# Patient Record
Sex: MALE | Race: WHITE | NOT HISPANIC OR LATINO | Employment: UNEMPLOYED | URBAN - METROPOLITAN AREA
[De-identification: names, ages, dates, MRNs, and addresses within clinical notes are randomized per-mention and may not be internally consistent; named-entity substitution may affect disease eponyms.]

---

## 2019-01-13 ENCOUNTER — HOSPITAL ENCOUNTER (INPATIENT)
Facility: MEDICAL CENTER | Age: 49
LOS: 2 days | DRG: 378 | End: 2019-01-16
Attending: EMERGENCY MEDICINE | Admitting: INTERNAL MEDICINE
Payer: COMMERCIAL

## 2019-01-13 PROCEDURE — 36415 COLL VENOUS BLD VENIPUNCTURE: CPT

## 2019-01-13 PROCEDURE — 80053 COMPREHEN METABOLIC PANEL: CPT

## 2019-01-13 PROCEDURE — 94760 N-INVAS EAR/PLS OXIMETRY 1: CPT

## 2019-01-13 PROCEDURE — 99285 EMERGENCY DEPT VISIT HI MDM: CPT

## 2019-01-13 PROCEDURE — 85610 PROTHROMBIN TIME: CPT

## 2019-01-13 PROCEDURE — 83690 ASSAY OF LIPASE: CPT

## 2019-01-13 PROCEDURE — 85730 THROMBOPLASTIN TIME PARTIAL: CPT

## 2019-01-13 PROCEDURE — 85025 COMPLETE CBC W/AUTO DIFF WBC: CPT

## 2019-01-14 PROBLEM — E66.9 OBESITY: Status: ACTIVE | Noted: 2019-01-14

## 2019-01-14 PROBLEM — K92.2 GI BLEED: Status: ACTIVE | Noted: 2019-01-14

## 2019-01-14 PROBLEM — K20.90 ESOPHAGITIS: Status: ACTIVE | Noted: 2019-01-14

## 2019-01-14 PROBLEM — Z72.0 TOBACCO ABUSE: Status: ACTIVE | Noted: 2019-01-14

## 2019-01-14 PROBLEM — D64.9 ANEMIA: Status: ACTIVE | Noted: 2019-01-14

## 2019-01-14 PROBLEM — K22.719 BARRETT'S ESOPHAGUS WITH DYSPLASIA: Status: ACTIVE | Noted: 2019-01-14

## 2019-01-14 PROBLEM — D72.829 LEUKOCYTOSIS: Status: ACTIVE | Noted: 2019-01-14

## 2019-01-14 LAB
ABO GROUP BLD: NORMAL
ABO GROUP BLD: NORMAL
ALBUMIN SERPL BCP-MCNC: 3.5 G/DL (ref 3.2–4.9)
ALBUMIN/GLOB SERPL: 1.5 G/DL
ALP SERPL-CCNC: 39 U/L (ref 30–99)
ALT SERPL-CCNC: 18 U/L (ref 2–50)
ANION GAP SERPL CALC-SCNC: 8 MMOL/L (ref 0–11.9)
APTT PPP: 27.5 SEC (ref 24.7–36)
AST SERPL-CCNC: 8 U/L (ref 12–45)
BASOPHILS # BLD AUTO: 0.5 % (ref 0–1.8)
BASOPHILS # BLD: 0.06 K/UL (ref 0–0.12)
BILIRUB SERPL-MCNC: 0.6 MG/DL (ref 0.1–1.5)
BLD GP AB SCN SERPL QL: NORMAL
BUN SERPL-MCNC: 63 MG/DL (ref 8–22)
CALCIUM SERPL-MCNC: 8.4 MG/DL (ref 8.5–10.5)
CHLORIDE SERPL-SCNC: 107 MMOL/L (ref 96–112)
CO2 SERPL-SCNC: 24 MMOL/L (ref 20–33)
CREAT SERPL-MCNC: 1.2 MG/DL (ref 0.5–1.4)
EKG IMPRESSION: NORMAL
EOSINOPHIL # BLD AUTO: 0.22 K/UL (ref 0–0.51)
EOSINOPHIL NFR BLD: 1.8 % (ref 0–6.9)
ERYTHROCYTE [DISTWIDTH] IN BLOOD BY AUTOMATED COUNT: 46.2 FL (ref 35.9–50)
GLOBULIN SER CALC-MCNC: 2.3 G/DL (ref 1.9–3.5)
GLUCOSE SERPL-MCNC: 94 MG/DL (ref 65–99)
HCT VFR BLD AUTO: 30 % (ref 42–52)
HGB BLD-MCNC: 10.1 G/DL (ref 14–18)
HGB BLD-MCNC: 10.3 G/DL (ref 14–18)
HGB BLD-MCNC: 9.4 G/DL (ref 14–18)
IMM GRANULOCYTES # BLD AUTO: 0.13 K/UL (ref 0–0.11)
IMM GRANULOCYTES NFR BLD AUTO: 1.1 % (ref 0–0.9)
INR PPP: 1.12 (ref 0.87–1.13)
LIPASE SERPL-CCNC: 23 U/L (ref 11–82)
LYMPHOCYTES # BLD AUTO: 2.89 K/UL (ref 1–4.8)
LYMPHOCYTES NFR BLD: 24 % (ref 22–41)
MAGNESIUM SERPL-MCNC: 2.3 MG/DL (ref 1.5–2.5)
MCH RBC QN AUTO: 31.3 PG (ref 27–33)
MCHC RBC AUTO-ENTMCNC: 34.3 G/DL (ref 33.7–35.3)
MCV RBC AUTO: 91.2 FL (ref 81.4–97.8)
MONOCYTES # BLD AUTO: 0.97 K/UL (ref 0–0.85)
MONOCYTES NFR BLD AUTO: 8 % (ref 0–13.4)
NEUTROPHILS # BLD AUTO: 7.79 K/UL (ref 1.82–7.42)
NEUTROPHILS NFR BLD: 64.6 % (ref 44–72)
NRBC # BLD AUTO: 0 K/UL
NRBC BLD-RTO: 0 /100 WBC
PATHOLOGY CONSULT NOTE: NORMAL
PLATELET # BLD AUTO: 239 K/UL (ref 164–446)
PMV BLD AUTO: 10.2 FL (ref 9–12.9)
POTASSIUM SERPL-SCNC: 3.5 MMOL/L (ref 3.6–5.5)
PROT SERPL-MCNC: 5.8 G/DL (ref 6–8.2)
PROTHROMBIN TIME: 14.5 SEC (ref 12–14.6)
RBC # BLD AUTO: 3.29 M/UL (ref 4.7–6.1)
RH BLD: NORMAL
RH BLD: NORMAL
SODIUM SERPL-SCNC: 139 MMOL/L (ref 135–145)
WBC # BLD AUTO: 12.1 K/UL (ref 4.8–10.8)

## 2019-01-14 PROCEDURE — 700111 HCHG RX REV CODE 636 W/ 250 OVERRIDE (IP): Performed by: INTERNAL MEDICINE

## 2019-01-14 PROCEDURE — 96365 THER/PROPH/DIAG IV INF INIT: CPT

## 2019-01-14 PROCEDURE — 770020 HCHG ROOM/CARE - TELE (206)

## 2019-01-14 PROCEDURE — C9113 INJ PANTOPRAZOLE SODIUM, VIA: HCPCS | Performed by: INTERNAL MEDICINE

## 2019-01-14 PROCEDURE — 99358 PROLONG SERVICE W/O CONTACT: CPT | Performed by: INTERNAL MEDICINE

## 2019-01-14 PROCEDURE — 93005 ELECTROCARDIOGRAM TRACING: CPT | Performed by: INTERNAL MEDICINE

## 2019-01-14 PROCEDURE — 99223 1ST HOSP IP/OBS HIGH 75: CPT | Mod: 25 | Performed by: INTERNAL MEDICINE

## 2019-01-14 PROCEDURE — 700105 HCHG RX REV CODE 258: Performed by: INTERNAL MEDICINE

## 2019-01-14 PROCEDURE — 700105 HCHG RX REV CODE 258: Performed by: EMERGENCY MEDICINE

## 2019-01-14 PROCEDURE — 36415 COLL VENOUS BLD VENIPUNCTURE: CPT

## 2019-01-14 PROCEDURE — 700111 HCHG RX REV CODE 636 W/ 250 OVERRIDE (IP): Performed by: EMERGENCY MEDICINE

## 2019-01-14 PROCEDURE — 700102 HCHG RX REV CODE 250 W/ 637 OVERRIDE(OP): Performed by: INTERNAL MEDICINE

## 2019-01-14 PROCEDURE — 160002 HCHG RECOVERY MINUTES (STAT): Performed by: INTERNAL MEDICINE

## 2019-01-14 PROCEDURE — 160029 HCHG SURGERY MINUTES - 1ST 30 MINS LEVEL 4: Performed by: INTERNAL MEDICINE

## 2019-01-14 PROCEDURE — 85025 COMPLETE CBC W/AUTO DIFF WBC: CPT

## 2019-01-14 PROCEDURE — 160035 HCHG PACU - 1ST 60 MINS PHASE I: Performed by: INTERNAL MEDICINE

## 2019-01-14 PROCEDURE — 86850 RBC ANTIBODY SCREEN: CPT

## 2019-01-14 PROCEDURE — 700111 HCHG RX REV CODE 636 W/ 250 OVERRIDE (IP)

## 2019-01-14 PROCEDURE — 86901 BLOOD TYPING SEROLOGIC RH(D): CPT

## 2019-01-14 PROCEDURE — 500066 HCHG BITE BLOCK, ECT: Performed by: INTERNAL MEDICINE

## 2019-01-14 PROCEDURE — 0DJ08ZZ INSPECTION OF UPPER INTESTINAL TRACT, VIA NATURAL OR ARTIFICIAL OPENING ENDOSCOPIC: ICD-10-PCS | Performed by: INTERNAL MEDICINE

## 2019-01-14 PROCEDURE — 83735 ASSAY OF MAGNESIUM: CPT

## 2019-01-14 PROCEDURE — 93010 ELECTROCARDIOGRAM REPORT: CPT | Performed by: INTERNAL MEDICINE

## 2019-01-14 PROCEDURE — 86900 BLOOD TYPING SEROLOGIC ABO: CPT

## 2019-01-14 PROCEDURE — 700101 HCHG RX REV CODE 250

## 2019-01-14 PROCEDURE — C9113 INJ PANTOPRAZOLE SODIUM, VIA: HCPCS | Performed by: EMERGENCY MEDICINE

## 2019-01-14 PROCEDURE — 88305 TISSUE EXAM BY PATHOLOGIST: CPT

## 2019-01-14 PROCEDURE — 99407 BEHAV CHNG SMOKING > 10 MIN: CPT | Performed by: INTERNAL MEDICINE

## 2019-01-14 PROCEDURE — 84443 ASSAY THYROID STIM HORMONE: CPT

## 2019-01-14 PROCEDURE — 160009 HCHG ANES TIME/MIN: Performed by: INTERNAL MEDICINE

## 2019-01-14 PROCEDURE — 160048 HCHG OR STATISTICAL LEVEL 1-5: Performed by: INTERNAL MEDICINE

## 2019-01-14 PROCEDURE — 80048 BASIC METABOLIC PNL TOTAL CA: CPT

## 2019-01-14 PROCEDURE — A9270 NON-COVERED ITEM OR SERVICE: HCPCS | Performed by: INTERNAL MEDICINE

## 2019-01-14 PROCEDURE — 85018 HEMOGLOBIN: CPT

## 2019-01-14 PROCEDURE — 83036 HEMOGLOBIN GLYCOSYLATED A1C: CPT

## 2019-01-14 RX ORDER — ONDANSETRON 2 MG/ML
4 INJECTION INTRAMUSCULAR; INTRAVENOUS
Status: DISCONTINUED | OUTPATIENT
Start: 2019-01-14 | End: 2019-01-14 | Stop reason: HOSPADM

## 2019-01-14 RX ORDER — ALBUTEROL SULFATE 90 UG/1
2 AEROSOL, METERED RESPIRATORY (INHALATION)
Status: DISCONTINUED | OUTPATIENT
Start: 2019-01-14 | End: 2019-01-16 | Stop reason: HOSPADM

## 2019-01-14 RX ORDER — NICOTINE 21 MG/24HR
14 PATCH, TRANSDERMAL 24 HOURS TRANSDERMAL
Status: DISCONTINUED | OUTPATIENT
Start: 2019-01-14 | End: 2019-01-16 | Stop reason: HOSPADM

## 2019-01-14 RX ORDER — OXYCODONE HCL 5 MG/5 ML
5 SOLUTION, ORAL ORAL
Status: DISCONTINUED | OUTPATIENT
Start: 2019-01-14 | End: 2019-01-14 | Stop reason: HOSPADM

## 2019-01-14 RX ORDER — ONDANSETRON 4 MG/1
4 TABLET, ORALLY DISINTEGRATING ORAL EVERY 4 HOURS PRN
Status: DISCONTINUED | OUTPATIENT
Start: 2019-01-14 | End: 2019-01-16 | Stop reason: HOSPADM

## 2019-01-14 RX ORDER — SODIUM CHLORIDE, SODIUM LACTATE, POTASSIUM CHLORIDE, CALCIUM CHLORIDE 600; 310; 30; 20 MG/100ML; MG/100ML; MG/100ML; MG/100ML
INJECTION, SOLUTION INTRAVENOUS CONTINUOUS
Status: DISCONTINUED | OUTPATIENT
Start: 2019-01-14 | End: 2019-01-14 | Stop reason: HOSPADM

## 2019-01-14 RX ORDER — ONDANSETRON 2 MG/ML
4 INJECTION INTRAMUSCULAR; INTRAVENOUS EVERY 4 HOURS PRN
Status: DISCONTINUED | OUTPATIENT
Start: 2019-01-14 | End: 2019-01-16 | Stop reason: HOSPADM

## 2019-01-14 RX ORDER — PANTOPRAZOLE SODIUM 40 MG/10ML
40 INJECTION, POWDER, LYOPHILIZED, FOR SOLUTION INTRAVENOUS DAILY
Status: DISCONTINUED | OUTPATIENT
Start: 2019-01-14 | End: 2019-01-15

## 2019-01-14 RX ORDER — IPRATROPIUM BROMIDE AND ALBUTEROL SULFATE 2.5; .5 MG/3ML; MG/3ML
3 SOLUTION RESPIRATORY (INHALATION)
Status: DISCONTINUED | OUTPATIENT
Start: 2019-01-14 | End: 2019-01-14 | Stop reason: HOSPADM

## 2019-01-14 RX ORDER — OXYCODONE HCL 5 MG/5 ML
10 SOLUTION, ORAL ORAL
Status: DISCONTINUED | OUTPATIENT
Start: 2019-01-14 | End: 2019-01-14 | Stop reason: HOSPADM

## 2019-01-14 RX ORDER — ACETAMINOPHEN 325 MG/1
650 TABLET ORAL EVERY 6 HOURS PRN
Status: DISCONTINUED | OUTPATIENT
Start: 2019-01-14 | End: 2019-01-16 | Stop reason: HOSPADM

## 2019-01-14 RX ORDER — HALOPERIDOL 5 MG/ML
1 INJECTION INTRAMUSCULAR
Status: DISCONTINUED | OUTPATIENT
Start: 2019-01-14 | End: 2019-01-14 | Stop reason: HOSPADM

## 2019-01-14 RX ORDER — ACETAMINOPHEN 500 MG
1000 TABLET ORAL EVERY 8 HOURS PRN
Status: DISCONTINUED | OUTPATIENT
Start: 2019-01-14 | End: 2019-01-14 | Stop reason: HOSPADM

## 2019-01-14 RX ORDER — MEPERIDINE HYDROCHLORIDE 25 MG/ML
12.5 INJECTION INTRAMUSCULAR; INTRAVENOUS; SUBCUTANEOUS
Status: DISCONTINUED | OUTPATIENT
Start: 2019-01-14 | End: 2019-01-14 | Stop reason: HOSPADM

## 2019-01-14 RX ORDER — SODIUM CHLORIDE, SODIUM LACTATE, POTASSIUM CHLORIDE, CALCIUM CHLORIDE 600; 310; 30; 20 MG/100ML; MG/100ML; MG/100ML; MG/100ML
INJECTION, SOLUTION INTRAVENOUS CONTINUOUS
Status: DISCONTINUED | OUTPATIENT
Start: 2019-01-14 | End: 2019-01-15

## 2019-01-14 RX ADMIN — SODIUM CHLORIDE 8 MG/HR: 9 INJECTION, SOLUTION INTRAVENOUS at 01:42

## 2019-01-14 RX ADMIN — SODIUM CHLORIDE, POTASSIUM CHLORIDE, SODIUM LACTATE AND CALCIUM CHLORIDE: 600; 310; 30; 20 INJECTION, SOLUTION INTRAVENOUS at 17:53

## 2019-01-14 RX ADMIN — SODIUM CHLORIDE, POTASSIUM CHLORIDE, SODIUM LACTATE AND CALCIUM CHLORIDE: 600; 310; 30; 20 INJECTION, SOLUTION INTRAVENOUS at 09:44

## 2019-01-14 RX ADMIN — SODIUM CHLORIDE, POTASSIUM CHLORIDE, SODIUM LACTATE AND CALCIUM CHLORIDE: 600; 310; 30; 20 INJECTION, SOLUTION INTRAVENOUS at 01:41

## 2019-01-14 RX ADMIN — NICOTINE 14 MG: 14 PATCH, EXTENDED RELEASE TRANSDERMAL at 05:03

## 2019-01-14 RX ADMIN — PANTOPRAZOLE SODIUM 40 MG: 40 INJECTION, POWDER, LYOPHILIZED, FOR SOLUTION INTRAVENOUS at 17:50

## 2019-01-14 RX ADMIN — SODIUM CHLORIDE 8 MG/HR: 9 INJECTION, SOLUTION INTRAVENOUS at 00:34

## 2019-01-14 RX ADMIN — SODIUM CHLORIDE 8 MG/HR: 9 INJECTION, SOLUTION INTRAVENOUS at 12:00

## 2019-01-14 ASSESSMENT — ENCOUNTER SYMPTOMS
ORTHOPNEA: 0
SHORTNESS OF BREATH: 1
BRUISES/BLEEDS EASILY: 0
COUGH: 0
SEIZURES: 0
ABDOMINAL PAIN: 1
NAUSEA: 1
ROS GI COMMENTS: HEMATEMESIS
HEARTBURN: 0
BLURRED VISION: 0
HEADACHES: 0
WHEEZING: 0
FEVER: 0
CHILLS: 0
SPUTUM PRODUCTION: 0
CHILLS: 1
BACK PAIN: 0
PND: 0
HEMOPTYSIS: 0
HEARTBURN: 1
DIAPHORESIS: 0
PHOTOPHOBIA: 0
ABDOMINAL PAIN: 0
FLANK PAIN: 0
FOCAL WEAKNESS: 0
COUGH: 1
DIZZINESS: 1
SORE THROAT: 0
VOMITING: 1
DIARRHEA: 1
WEIGHT LOSS: 0
PALPITATIONS: 0
NECK PAIN: 0
MYALGIAS: 0
BLOOD IN STOOL: 1
BLOOD IN STOOL: 0
DIARRHEA: 0
CONSTIPATION: 0

## 2019-01-14 ASSESSMENT — COPD QUESTIONNAIRES
HAVE YOU SMOKED AT LEAST 100 CIGARETTES IN YOUR ENTIRE LIFE: YES
IN THE PAST 12 MONTHS DO YOU DO LESS THAN YOU USED TO BECAUSE OF YOUR BREATHING PROBLEMS: DISAGREE/UNSURE
DURING THE PAST 4 WEEKS HOW MUCH DID YOU FEEL SHORT OF BREATH: SOME OF THE TIME
COPD SCREENING SCORE: 3
HAVE YOU SMOKED AT LEAST 100 CIGARETTES IN YOUR ENTIRE LIFE: YES
COPD SCREENING SCORE: 6
DO YOU EVER COUGH UP ANY MUCUS OR PHLEGM?: YES, EVERY DAY
DO YOU EVER COUGH UP ANY MUCUS OR PHLEGM?: NO/ONLY WITH OCCASIONAL COLDS OR INFECTIONS
DURING THE PAST 4 WEEKS HOW MUCH DID YOU FEEL SHORT OF BREATH: SOME OF THE TIME

## 2019-01-14 ASSESSMENT — COGNITIVE AND FUNCTIONAL STATUS - GENERAL
SUGGESTED CMS G CODE MODIFIER MOBILITY: CJ
MOBILITY SCORE: 22
DAILY ACTIVITIY SCORE: 24
MOVING FROM LYING ON BACK TO SITTING ON SIDE OF FLAT BED: A LITTLE
CLIMB 3 TO 5 STEPS WITH RAILING: A LITTLE
SUGGESTED CMS G CODE MODIFIER DAILY ACTIVITY: CH

## 2019-01-14 ASSESSMENT — LIFESTYLE VARIABLES
TOTAL SCORE: 0
TOTAL SCORE: 0
EVER FELT BAD OR GUILTY ABOUT YOUR DRINKING: NO
EVER_SMOKED: YES
HOW MANY TIMES IN THE PAST YEAR HAVE YOU HAD 5 OR MORE DRINKS IN A DAY: 0
HAVE PEOPLE ANNOYED YOU BY CRITICIZING YOUR DRINKING: NO
AVERAGE NUMBER OF DAYS PER WEEK YOU HAVE A DRINK CONTAINING ALCOHOL: 1
ALCOHOL_USE: YES
EVER HAD A DRINK FIRST THING IN THE MORNING TO STEADY YOUR NERVES TO GET RID OF A HANGOVER: NO
CONSUMPTION TOTAL: NEGATIVE
EVER_SMOKED: YES
TOTAL SCORE: 0
EVER_SMOKED: YES
ON A TYPICAL DAY WHEN YOU DRINK ALCOHOL HOW MANY DRINKS DO YOU HAVE: 2
HAVE YOU EVER FELT YOU SHOULD CUT DOWN ON YOUR DRINKING: NO

## 2019-01-14 ASSESSMENT — PAIN SCALES - GENERAL
PAINLEVEL_OUTOF10: 0

## 2019-01-14 ASSESSMENT — PATIENT HEALTH QUESTIONNAIRE - PHQ9
2. FEELING DOWN, DEPRESSED, IRRITABLE, OR HOPELESS: NOT AT ALL
SUM OF ALL RESPONSES TO PHQ9 QUESTIONS 1 AND 2: 0
1. LITTLE INTEREST OR PLEASURE IN DOING THINGS: NOT AT ALL

## 2019-01-14 NOTE — ED TRIAGE NOTES
Jose Carlos Bill  48 y.o. male  Chief Complaint   Patient presents with   • Upper GI Bleed         Pt is alert and oriented, speaking in full sentences, follows commands and responds appropriately to questions. Resp are even and unlabored. No behavioral indicators of pain.    Pt transferred from [facility] for further work up of a GI bleed. Pt received protonix IV push as well as Protonix gtt, pt also received 1 unit PRBC.

## 2019-01-14 NOTE — H&P
Hospital Medicine History & Physical Note    Date of Service  1/14/2019    Primary Care Physician  No primary care provider on file.    Consultants  None    Code Status  Full code    Chief Complaint  Hematemesis    History of Presenting Illness  48 y.o. male with a past medical history of obesity, hypertension, hyperlipidemia and tobacco abuse who presented 1/13/2019 with melena and hematemesis that started yesterday.  The patient reported multiple episodes of loose melanotic stools for the past 2 days.  Yesterday he developed nausea with multiple episodes of hematemesis.  He reported associated shortness of breath and dizziness.  He reports some chills.  He denied any chest pain, abdominal pain, fevers, headache or dysuria.  He reports drinking 1 alcoholic beverage every 3 weeks.  He denies taking any blood thinners or using NSAIDs.  He smokes 1-1/2 packs of cigarettes daily.  He denies any previous history of GI bleeding.    He presented to [facility], I have reviewed the medical records which are summarized as follows. EKG interpreted by me reveals sinus tachycardia with incomplete right bundle branch block.  No ST elevation noted.  WBC 12, hemoglobin 10.8, hematocrit 31.2, MCV 90, platelets 271, absolute neutrophils 7.6, absolute monocytes 1.0, sodium 139, potassium 3.3, chloride 103, CO2 23, glucose 102, BUN 74, creatinine 1.22, AST 13, ALT 27, alk phos 51, total bili 0.6, total protein 6.2, albumin 3.7, calcium 9, lipase 34, INR 1, APTT 23.  Troponin 0 0.028, BNP 10, lactic 1.7.  Patient was given IV Protonix bolus and transferred to [facility] for evaluation by GI.    Review of Systems  Review of Systems   Constitutional: Positive for chills. Negative for diaphoresis and fever.   HENT: Negative for hearing loss and sore throat.    Eyes: Negative for blurred vision.   Respiratory: Positive for shortness of breath. Negative for cough, sputum production and wheezing.    Cardiovascular: Negative for chest pain,  palpitations and leg swelling.   Gastrointestinal: Positive for melena, nausea and vomiting. Negative for abdominal pain, blood in stool and diarrhea.        Hematemesis   Genitourinary: Negative for dysuria, flank pain and urgency.   Musculoskeletal: Negative for back pain, joint pain, myalgias and neck pain.   Skin: Negative for rash.   Neurological: Positive for dizziness. Negative for focal weakness, seizures and headaches.   Endo/Heme/Allergies: Does not bruise/bleed easily.   Psychiatric/Behavioral: Negative for suicidal ideas.   All other systems reviewed and are negative.      Past Medical History  Obesity, hypertension, tobacco abuse    Surgical History  No pertinent surgical history    Family History  No pertinent family history    Social History   reports that he has been smoking.  He has been smoking about 1.00 pack per day. He has never used smokeless tobacco. He reports that he drinks alcohol. He reports that he does not use drugs.    Allergies  No Known Allergies    Medications  None       Physical Exam  Temp:  [37.4 °C (99.3 °F)] 37.4 °C (99.3 °F)  Pulse:  [90-92] 91  Resp:  [13-17] 13  BP: (129)/(70) 129/70    Physical Exam   Constitutional: He is oriented to person, place, and time. He appears well-developed and well-nourished. No distress.   Obese   HENT:   Head: Normocephalic and atraumatic.   Mouth/Throat: Oropharynx is clear and moist.   Eyes: Pupils are equal, round, and reactive to light. Conjunctivae are normal. No scleral icterus.   Neck: Normal range of motion. Neck supple.   Cardiovascular: Regular rhythm and normal heart sounds.    Tachycardic   Pulmonary/Chest: Effort normal and breath sounds normal. No respiratory distress. He has no wheezes. He has no rales.   Abdominal: Soft. Bowel sounds are normal. He exhibits no distension. There is no tenderness. There is no rebound.   Musculoskeletal: Normal range of motion. He exhibits no edema or tenderness.   Lymphadenopathy:     He has no  cervical adenopathy.   Neurological: He is alert and oriented to person, place, and time. No cranial nerve deficit. Coordination normal.   Skin: Skin is warm. No rash noted.   Psychiatric: He has a normal mood and affect. His behavior is normal.   Nursing note and vitals reviewed.      Laboratory:  Recent Labs      01/13/19   2339   WBC  12.1*   RBC  3.29*   HEMOGLOBIN  10.3*   HEMATOCRIT  30.0*   MCV  91.2   MCH  31.3   MCHC  34.3   RDW  46.2   PLATELETCT  239   MPV  10.2         No results for input(s): ALTSGPT, ASTSGOT, ALKPHOSPHAT, TBILIRUBIN, DBILIRUBIN, GAMMAGT, AMYLASE, LIPASE, ALB, PREALBUMIN, GLUCOSE in the last 72 hours.              No results for input(s): TROPONINI in the last 72 hours.    Urinalysis:    No results found     Imaging:  No orders to display         Assessment/Plan:  I anticipate this patient will require at least two midnights for appropriate medical management, necessitating inpatient admission.    GI bleed- (present on admission)   Assessment & Plan    With hematemesis and melena, likely upper GI source  Continuous cardiac monitoring  Patient has been started on IV fluid hydration with lactated ringer  NPO  Patient is started on IV Protonix  Monitor H&H every 8 hours, transfuse for hemoglobin less than 7  Coagulation studies within normal limits  We will consult GI in the morning for endoscopy evaluation       Anemia- (present on admission)   Assessment & Plan    Secondary to GI bleed  Monitor CBC, transfuse for hemoglobin less than 7       Leukocytosis- (present on admission)   Assessment & Plan    No obvious evidence of infection at this time, likely leukemoid reaction  Monitor CBC and vitals     Obesity- (present on admission)   Assessment & Plan    Body mass index is 44.48 kg/m².  Pt educated on the increase of morbidity and mortality associated with excess weight including DM, Heart Disease, HTN, stroke, and sleep apnea.  Pt advised weight loss of 5% through reduced calorie, low  carb diet and 150 mins of exercise a week       Tobacco abuse- (present on admission)   Assessment & Plan    Tobacco cessation education provided for more than 10 minutes, discussed options of nicotine patch, medical treatment with wellbutrin and chantix. Discussed the risks of smoking including increased risk of heart disease, stroke, cancer and COPD. Discussed the benefits of quitting smoking. Nicotine replacement protocol will be provided to the patient.           VTE prophylaxis: SCD    I spent a total of 30 minutes of non face to face time performing additional research, reviewing medical records from transferring facility, discussing plan of care with other healthcare providers. Start time: 12 25 am. End time: 12 55 am

## 2019-01-14 NOTE — PROGRESS NOTES
Patient arrived to [id number] bed 2. Transferred into bed via walking. Received report from ED RN, Pt assessed, A&Ox4, Vitals stable, pt complains of no pain. No SOB Noted.   Pt updated on plan of care, Call light within reach, personal belongings within reach.  Bed in lowest position.Pt ambulates via standby assist. Tele monitor on and monitor room notified. Hourly rounding in place.

## 2019-01-14 NOTE — OP REPORT
DATE OF SERVICE:  01/14/2019    INDICATION FOR PROCEDURE:  GI bleeding.    CONSENT:  Informed consent was obtained directly from the patient after   benefits, risks and possible alternatives were discussed.    MEDICATIONS:  The patient received general anesthetic from Dr. Mccall from   anesthesia.  Please see the notes for full details.    PROCEDURE DESCRIPTION:  The patient was placed in the supine position and   endotracheally intubated.  When ready, the upper endoscope was placed in the   patient's mouth and advanced easily and carefully to the esophagus and   subsequently the stomach and duodenum.    FINDINGS:  In the esophagus, there was severe somewhat cavernous ulcerated   reflux esophagitis present beginning at the GE junction extending proximally   approximately 10 cm.  Additionally, there was reddish mucosa consistent with   Hoffman's mucosa.  The overall appearance suggested a benign process, no   biopsies were obtained as there was significant amount nodularity to the   ulceration.  The stomach had a small hiatal hernia, otherwise was normal and   empty.  The duodenum appeared normal to the second portion.  Retroflexed views   of the cardia and angularis were obtained.  The scope was then withdrawn   after excess air was removed from the gastric lumen.    COMPLICATIONS:  No complications during or in the immediate postoperative   period.    IMPRESSION:  1.  Severe ulcerated reflux esophagitis, x10 cm, with likely associated   Hoffman's mucosa.  2.  Small hiatal hernia.  3.  Otherwise, normal upper endoscopy.    RECOMMENDATION:  The patient will undergo reflux precautions.  We will   continue on PPIs, though we will discontinue his PPI drip at this time and   transition him to IV PPIs once daily, and then hopefully tomorrow to oral   b.i.d.  The patient will require repeat upper endoscopy in about 8-10 weeks   for reevaluation and rebiopsy.       ___________________________________Chad SPRINGER  MD BROWN / AYAKA    DD:  01/14/2019 15:23:05  DT:  01/14/2019 15:35:25    D#:  [id number]  Job#:  [id number]

## 2019-01-14 NOTE — ASSESSMENT & PLAN NOTE
Body mass index is 44.48 kg/m².  Pt educated on the increase of morbidity and mortality associated with excess weight including DM, Heart Disease, HTN, stroke, and sleep apnea.  Pt advised weight loss of 5% through reduced calorie, low carb diet and 150 mins of exercise a week

## 2019-01-14 NOTE — CONSULTS
GASTROENTEROLOGY CONSULTANT NOTE    Chief Complaint:   Coffee ground emesis and loose dark stools since 2 days    HPI:    Patient is a 48-year-old male with a past medical history of hypertension, hyperlipidemia, obesity and tobacco abuse not on any home meds who presents as a transfer from [place] for further evaluation of upper GI bleed.    Patient was apparently well until yesterday morning when he woke up not feeling so well.  He was nauseous, vomited x1-brownish blackish food with water and also had 2 episodes of loose brownish blackish stool.  He was short of breath, dizzy and this decided to go to the ED.  At [facility] he was noted to be hypotensive, tachycardic, H&H of 10.8/30, BUN 74, creatinine 1.2.  He was started on a Protonix drip, was given 1 unit PRBCs and transferred to [facility] for further evaluation.    Since admission here, patient denies any more episodes of emesis or loose stools. Patient states that he has intermittent complaints of food getting stuck in throat and trouble swallowing foods like bread and need to drink water to push the food down.   Denies any heartburn symptoms now. Denies any similar history in the past.  Denies NSAID use.  Drinks 1 mixed drink once every 3 weeks.  Denies any family history of colon cancer.  Never got a colonoscopy or endoscopy in the past.    Remains hemodynamically stable.  Hemoglobin stable at 10.1.    Review of Systems   Constitutional: Positive for chills and malaise/fatigue.   Respiratory: Positive for cough and shortness of breath.    Cardiovascular: Positive for leg swelling. Negative for chest pain.   Gastrointestinal: Positive for diarrhea, nausea and vomiting. Negative for abdominal pain and heartburn.             Past Medical History  HTN  HLD  Obesity  Tobacco abuse    Past Surgical History:  History reviewed. No pertinent surgical history.    Current Outpatient Medications:  Home Medications     Reviewed by Jerome Nails (Pharmacy Tech) on  "01/14/19 at 1228  Med List Status: Complete   Medication Last Dose Status        Patient Chema Taking any Medications                       Medication Allergy/Sensitivities:  No Known Allergies      Family History (mandatory)   History reviewed. No pertinent family history.  Denies any family h/o colon cancer    Social History (mandatory)   Social History     Social History   • Marital status: Single     Spouse name: N/A   • Number of children: N/A   • Years of education: N/A     Occupational History   • Not on file.     Social History Main Topics   • Smoking status: Current Every Day Smoker     Packs/day: 1.00   • Smokeless tobacco: Never Used   • Alcohol use Yes      Comment: occ   • Drug use: No   • Sexual activity: Not on file     Other Topics Concern   • Not on file     Social History Narrative   • No narrative on file   Smokes >1ppd since 5-6 years  Drinks 1 alcoholic drink every 3 weeks  Denies drug use  Denies use of NSAIDs      Physical Exam     Vitals:    01/14/19 0400 01/14/19 0730 01/14/19 1200 01/14/19 1213   BP: 138/76 137/88 112/82    Pulse: 92 80 80 84   Resp: 16 18 17 16   Temp: 36.3 °C (97.4 °F) 36.4 °C (97.6 °F) 36.1 °C (96.9 °F)    TempSrc: Temporal Temporal Temporal    SpO2: 98% 96% 98%    Weight:       Height:         Body mass index is 48.82 kg/m².  /82   Pulse 84   Temp 36.1 °C (96.9 °F) (Temporal)   Resp 16   Ht 1.676 m (5' 6\")   Wt (!) 137.2 kg (302 lb 7.5 oz)   SpO2 98%   BMI 48.82 kg/m²   O2 therapy: Pulse Oximetry: 98 %, O2 (LPM): 0, O2 Delivery: None (Room Air)    Physical Exam   Constitutional: He is oriented to person, place, and time and well-developed, well-nourished, and in no distress.   HENT:   Head: Normocephalic.   Eyes: Pupils are equal, round, and reactive to light. EOM are normal.   Neck: Normal range of motion.   Cardiovascular: Normal rate and regular rhythm.    Pulmonary/Chest: Effort normal.   Abdominal: Soft. There is no tenderness.   Musculoskeletal: He " exhibits edema.   Neurological: He is alert and oriented to person, place, and time.   Skin: Skin is warm.         Data Review           Lab Data Review:  Recent Results (from the past 24 hour(s))   CBC WITH DIFFERENTIAL    Collection Time: 01/13/19 11:39 PM   Result Value Ref Range    WBC 12.1 (H) 4.8 - 10.8 K/uL    RBC 3.29 (L) 4.70 - 6.10 M/uL    Hemoglobin 10.3 (L) 14.0 - 18.0 g/dL    Hematocrit 30.0 (L) 42.0 - 52.0 %    MCV 91.2 81.4 - 97.8 fL    MCH 31.3 27.0 - 33.0 pg    MCHC 34.3 33.7 - 35.3 g/dL    RDW 46.2 35.9 - 50.0 fL    Platelet Count 239 164 - 446 K/uL    MPV 10.2 9.0 - 12.9 fL    Neutrophils-Polys 64.60 44.00 - 72.00 %    Lymphocytes 24.00 22.00 - 41.00 %    Monocytes 8.00 0.00 - 13.40 %    Eosinophils 1.80 0.00 - 6.90 %    Basophils 0.50 0.00 - 1.80 %    Immature Granulocytes 1.10 (H) 0.00 - 0.90 %    Nucleated RBC 0.00 /100 WBC    Neutrophils (Absolute) 7.79 (H) 1.82 - 7.42 K/uL    Lymphs (Absolute) 2.89 1.00 - 4.80 K/uL    Monos (Absolute) 0.97 (H) 0.00 - 0.85 K/uL    Eos (Absolute) 0.22 0.00 - 0.51 K/uL    Baso (Absolute) 0.06 0.00 - 0.12 K/uL    Immature Granulocytes (abs) 0.13 (H) 0.00 - 0.11 K/uL    NRBC (Absolute) 0.00 K/uL   COMP METABOLIC PANEL    Collection Time: 01/13/19 11:39 PM   Result Value Ref Range    Sodium 139 135 - 145 mmol/L    Potassium 3.5 (L) 3.6 - 5.5 mmol/L    Chloride 107 96 - 112 mmol/L    Co2 24 20 - 33 mmol/L    Anion Gap 8.0 0.0 - 11.9    Glucose 94 65 - 99 mg/dL    Bun 63 (H) 8 - 22 mg/dL    Creatinine 1.20 0.50 - 1.40 mg/dL    Calcium 8.4 (L) 8.5 - 10.5 mg/dL    AST(SGOT) 8 (L) 12 - 45 U/L    ALT(SGPT) 18 2 - 50 U/L    Alkaline Phosphatase 39 30 - 99 U/L    Total Bilirubin 0.6 0.1 - 1.5 mg/dL    Albumin 3.5 3.2 - 4.9 g/dL    Total Protein 5.8 (L) 6.0 - 8.2 g/dL    Globulin 2.3 1.9 - 3.5 g/dL    A-G Ratio 1.5 g/dL   LIPASE    Collection Time: 01/13/19 11:39 PM   Result Value Ref Range    Lipase 23 11 - 82 U/L   PROTHROMBIN TIME (INR)    Collection Time: 01/13/19 11:39  PM   Result Value Ref Range    PT 14.5 12.0 - 14.6 sec    INR 1.12 0.87 - 1.13   APTT    Collection Time: 01/13/19 11:39 PM   Result Value Ref Range    APTT 27.5 24.7 - 36.0 sec   COD (ADULT)    Collection Time: 01/13/19 11:39 PM   Result Value Ref Range    ABO Grouping Only A     Rh Grouping Only POS     Antibody Screen-Cod NEG    ESTIMATED GFR    Collection Time: 01/13/19 11:39 PM   Result Value Ref Range    GFR If African American >60 >60 mL/min/1.73 m 2    GFR If Non African American >60 >60 mL/min/1.73 m 2   ABO AND RH CONFIRMATION    Collection Time: 01/14/19 12:38 AM   Result Value Ref Range    ABO Confirm A     Second Rh Group POS    Magnesium    Collection Time: 01/14/19  2:35 AM   Result Value Ref Range    Magnesium 2.3 1.5 - 2.5 mg/dL   HGB (Hemoglobin) for 48 hours    Collection Time: 01/14/19  7:04 AM   Result Value Ref Range    Hemoglobin 10.1 (L) 14.0 - 18.0 g/dL          ASSESSMENT AND PLAN:    # Upper GI bleed  # Dysphagia  # HTN  # Tobacco abuse  # Obesity    - Since admission here, patient denies any more episodes of emesis or loose stools  - Remains hemodynamically stable.   - Hemoglobin stable at 10.1.  - Cause of upper GI bleed likely 2/2 reflux vs gastritis  - Continue protonix drip  - NPO  - Plan for upper GI endoscopy today  - Further recs based on endoscopy findings    Quality Measures  Quality-Core Measures  PCP: No primary care provider on file.

## 2019-01-14 NOTE — ASSESSMENT & PLAN NOTE
With hematemesis and melena, likely upper GI source  Continuous cardiac monitoring  Monitor H&H every 8 hours, transfuse for hemoglobin less than 7- monitor for bleeding  S/P EGD that found no ulcer and pt had severe ulcerated esophagitis and snider esophagus  On IV protonix

## 2019-01-14 NOTE — CARE PLAN
Problem: Communication  Goal: The ability to communicate needs accurately and effectively will improve  Outcome: PROGRESSING AS EXPECTED  POC reviewed and all questions answered. Pt verbalized understanding of treatment and medications. Calls appropriately with needs and asks questions regarding care.       Problem: Safety  Goal: Will remain free from injury  Outcome: PROGRESSING AS EXPECTED  Pt oriented to call light system and educated to call for assistance. Non slip socks on. Fall precautions in place. Verbalized understanding of safety measures. Bed in locked and low position, call light in reach.

## 2019-01-14 NOTE — PROGRESS NOTES
Report received at the bed side. No family at bedside. Pt A/O x4. No complains of pain or SOB. Call light and belongings within reach. Bed alarm in place. Bed in lowest position.

## 2019-01-14 NOTE — PROGRESS NOTES
2 RN skin check completed with Shaila RN:  Skin intact, no open areas or wounds noted.  Mild redness/swelling to bilateral lower extremities, pt states this is normal for him.  Redness to R toe  No other areas of concern noted.

## 2019-01-14 NOTE — ED PROVIDER NOTES
ED Provider Note    ED Provider Note      Primary care provider: No primary care provider on file.    CHIEF COMPLAINT  Chief Complaint   Patient presents with   • Upper GI Bleed       HPI  Jose Carlos Khan is a 48 y.o. male who presents to the Emergency Department with chief complaint of upper GI bleed.  Patient was transferred from outside hospital after having evidence of upper GI bleed tachycardia hypotension.  He had H&H of approximately 10 and 30 at the outside hospital he was loaded with Protonix and was started on Protonix drip.  At arrival patient reports complete resolution of his symptoms that this morning he woke up felt as though he had a bloody nose but then later determined that he had vomited up blood.  He also reports that he said fairly loose stool over the last couple days that said some very dark appearance.  No previous history of GI bleed he states he has an alcoholic beverages approximately every 3weeks he denies any heavy NSAID use states he did use to use ibuprofen regularly but stopped after his father had problems with upper GI bleeding.  He has had some chills a little bit of cough recently no chest pain or shortness of breath he had a slight headache a few days ago for which he took half of Norco with alleviation.  Currently feeling much better with no other acute symptoms or concerns.    REVIEW OF SYSTEMS  10 systems reviewed and otherwise negative, pertinent positives and negatives listed in the history of present illness.    PAST MEDICAL HISTORY       SURGICAL HISTORY  patient denies any surgical history    SOCIAL HISTORY  Social History   Substance Use Topics   • Smoking status: Current Every Day Smoker     Packs/day: 1.00   • Smokeless tobacco: Never Used   • Alcohol use Yes      Comment: occ      History   Drug Use No       FAMILY HISTORY  Non-Contributory    CURRENT MEDICATIONS  Home Medications     Reviewed by Maximo Oseguera R.N. (Registered Nurse) on 01/13/19 at 5381  Med List  "Status: Partial   Medication Last Dose Status        Patient Chema Taking any Medications                       ALLERGIES  No Known Allergies    PHYSICAL EXAM  VITAL SIGNS: /70   Pulse 92   Temp 37.4 °C (99.3 °F) (Temporal)   Resp 17   Ht 1.676 m (5' 6\")   Wt 125 kg (275 lb 9.2 oz)   SpO2 95%   BMI 44.48 kg/m²   Pulse ox interpretation: I interpret this pulse ox as normal.  Constitutional: Alert and oriented x 3, no acute distress  HEENT: Atraumatic normocephalic, pupils are equal round reactive to light extraocular movements are intact. The nares is clear, external ears are normal, mouth shows moist mucous membranes  Neck: Supple, no JVD no tracheal deviation  Cardiovascular: Regular rate and rhythm no murmur rub or gallop 2+ pulses peripherally x4  Thorax & Lungs: No respiratory distress, no wheezes rales or rhonchi, No chest tenderness.   GI: Soft nontender nondistended positive bowel sounds, no peritoneal signs  Skin: Warm dry no acute rash or lesion  Musculoskeletal: Moving all extremities with full range and 5 of 5 strength, no acute  deformity 2+ pitting edema bilateral lower extremities to the midshin  Neurologic: Cranial nerves III through XII are grossly intact, no sensory deficit, no cerebellar dysfunction   Psychiatric: Appropriate affect for situation at this time      DIAGNOSTIC STUDIES / PROCEDURES  LABS      Results for orders placed or performed during the hospital encounter of 01/13/19   CBC WITH DIFFERENTIAL   Result Value Ref Range    WBC 12.1 (H) 4.8 - 10.8 K/uL    RBC 3.29 (L) 4.70 - 6.10 M/uL    Hemoglobin 10.3 (L) 14.0 - 18.0 g/dL    Hematocrit 30.0 (L) 42.0 - 52.0 %    MCV 91.2 81.4 - 97.8 fL    MCH 31.3 27.0 - 33.0 pg    MCHC 34.3 33.7 - 35.3 g/dL    RDW 46.2 35.9 - 50.0 fL    Platelet Count 239 164 - 446 K/uL    MPV 10.2 9.0 - 12.9 fL    Neutrophils-Polys 64.60 44.00 - 72.00 %    Lymphocytes 24.00 22.00 - 41.00 %    Monocytes 8.00 0.00 - 13.40 %    Eosinophils 1.80 0.00 - 6.90 " %    Basophils 0.50 0.00 - 1.80 %    Immature Granulocytes 1.10 (H) 0.00 - 0.90 %    Nucleated RBC 0.00 /100 WBC    Neutrophils (Absolute) 7.79 (H) 1.82 - 7.42 K/uL    Lymphs (Absolute) 2.89 1.00 - 4.80 K/uL    Monos (Absolute) 0.97 (H) 0.00 - 0.85 K/uL    Eos (Absolute) 0.22 0.00 - 0.51 K/uL    Baso (Absolute) 0.06 0.00 - 0.12 K/uL    Immature Granulocytes (abs) 0.13 (H) 0.00 - 0.11 K/uL    NRBC (Absolute) 0.00 K/uL   COMP METABOLIC PANEL   Result Value Ref Range    Sodium 139 135 - 145 mmol/L    Potassium 3.5 (L) 3.6 - 5.5 mmol/L    Chloride 107 96 - 112 mmol/L    Co2 24 20 - 33 mmol/L    Anion Gap 8.0 0.0 - 11.9    Glucose 94 65 - 99 mg/dL    Bun 63 (H) 8 - 22 mg/dL    Creatinine 1.20 0.50 - 1.40 mg/dL    Calcium 8.4 (L) 8.5 - 10.5 mg/dL    AST(SGOT) 8 (L) 12 - 45 U/L    ALT(SGPT) 18 2 - 50 U/L    Alkaline Phosphatase 39 30 - 99 U/L    Total Bilirubin 0.6 0.1 - 1.5 mg/dL    Albumin 3.5 3.2 - 4.9 g/dL    Total Protein 5.8 (L) 6.0 - 8.2 g/dL    Globulin 2.3 1.9 - 3.5 g/dL    A-G Ratio 1.5 g/dL   LIPASE   Result Value Ref Range    Lipase 23 11 - 82 U/L   PROTHROMBIN TIME (INR)   Result Value Ref Range    PT 14.5 12.0 - 14.6 sec    INR 1.12 0.87 - 1.13   APTT   Result Value Ref Range    APTT 27.5 24.7 - 36.0 sec   COD (ADULT)   Result Value Ref Range    ABO Grouping Only A     Rh Grouping Only POS     Antibody Screen-Cod NEG    Magnesium   Result Value Ref Range    Magnesium 2.3 1.5 - 2.5 mg/dL   ABO AND RH CONFIRMATION   Result Value Ref Range    ABO Confirm A     Second Rh Group POS    ESTIMATED GFR   Result Value Ref Range    GFR If African American >60 >60 mL/min/1.73 m 2    GFR If Non African American >60 >60 mL/min/1.73 m 2       All labs reviewed by me.      RADIOLOGY  No orders to display     The radiologist's interpretation of all radiological studies have been reviewed by me.    COURSE & MEDICAL DECISION MAKING  Pertinent Labs & Imaging studies reviewed. (See chart for details)    11:41 PM - Patient seen  "and examined at bedside.  Patient previously had bolus of Protonix at outside hospital he was also given 1 unit of PRBCs at OSH. we will reinitiate Protonix drip I see no indication for octreotide at this time no known liver disease or varices.  Patient has had resolution of symptoms at this time normotensive is not tachycardic will continue on Protonix admit to the hospitalist for GI consultation tomorrow do not believe it warrants emergent consultation tonight.  Had repeat blood work sent is pending at this time.  I discussed case with hospitalist Dr. Mckeon who agrees on admission is up this patient is greatly appreciated.        Patient noted to have slightly elevated blood pressure likely circumstantial secondary to presenting complaint. Referred to primary care physician for further evaluation.          /70   Pulse 92   Temp 37.4 °C (99.3 °F) (Temporal)   Resp 17   Ht 1.676 m (5' 6\")   Wt 125 kg (275 lb 9.2 oz)   SpO2 95%   BMI 44.48 kg/m²             FINAL IMPRESSION  1.  Acute upper GI bleed        This dictation has been created using voice recognition software and/or scribes. The accuracy of the dictation is limited by the abilities of the software and the expertise of the scribes. I expect there may be some errors of grammar and possibly content. I made every attempt to manually correct the errors within my dictation. However, errors related to voice recognition software and/or scribes may still exist and should be interpreted within the appropriate context.            "

## 2019-01-14 NOTE — DIETARY
NUTRITION SERVICES: BMI - Pt with BMI >40 (= 48.8). Weight loss counseling not appropriate in acute care setting.     RECOMMEND - Referral to outpatient nutrition services for weight management after D/C.

## 2019-01-15 LAB
ANION GAP SERPL CALC-SCNC: 8 MMOL/L (ref 0–11.9)
BASOPHILS # BLD AUTO: 0.1 % (ref 0–1.8)
BASOPHILS # BLD: 0.01 K/UL (ref 0–0.12)
BUN SERPL-MCNC: 26 MG/DL (ref 8–22)
CALCIUM SERPL-MCNC: 8.2 MG/DL (ref 8.5–10.5)
CHLORIDE SERPL-SCNC: 107 MMOL/L (ref 96–112)
CO2 SERPL-SCNC: 24 MMOL/L (ref 20–33)
CREAT SERPL-MCNC: 0.9 MG/DL (ref 0.5–1.4)
EOSINOPHIL # BLD AUTO: 0.01 K/UL (ref 0–0.51)
EOSINOPHIL NFR BLD: 0.1 % (ref 0–6.9)
ERYTHROCYTE [DISTWIDTH] IN BLOOD BY AUTOMATED COUNT: 47.8 FL (ref 35.9–50)
EST. AVERAGE GLUCOSE BLD GHB EST-MCNC: 111 MG/DL
GLUCOSE SERPL-MCNC: 143 MG/DL (ref 65–99)
HBA1C MFR BLD: 5.5 % (ref 0–5.6)
HCT VFR BLD AUTO: 27.2 % (ref 42–52)
HGB BLD-MCNC: 8.8 G/DL (ref 14–18)
HGB BLD-MCNC: 9.2 G/DL (ref 14–18)
HGB BLD-MCNC: 9.5 G/DL (ref 14–18)
IMM GRANULOCYTES # BLD AUTO: 0.09 K/UL (ref 0–0.11)
IMM GRANULOCYTES NFR BLD AUTO: 1.1 % (ref 0–0.9)
LYMPHOCYTES # BLD AUTO: 0.61 K/UL (ref 1–4.8)
LYMPHOCYTES NFR BLD: 7.5 % (ref 22–41)
MCH RBC QN AUTO: 31 PG (ref 27–33)
MCHC RBC AUTO-ENTMCNC: 33.8 G/DL (ref 33.7–35.3)
MCV RBC AUTO: 91.6 FL (ref 81.4–97.8)
MONOCYTES # BLD AUTO: 0.07 K/UL (ref 0–0.85)
MONOCYTES NFR BLD AUTO: 0.9 % (ref 0–13.4)
NEUTROPHILS # BLD AUTO: 7.38 K/UL (ref 1.82–7.42)
NEUTROPHILS NFR BLD: 90.3 % (ref 44–72)
NRBC # BLD AUTO: 0 K/UL
NRBC BLD-RTO: 0 /100 WBC
PLATELET # BLD AUTO: 223 K/UL (ref 164–446)
PMV BLD AUTO: 10 FL (ref 9–12.9)
POTASSIUM SERPL-SCNC: 4 MMOL/L (ref 3.6–5.5)
RBC # BLD AUTO: 2.97 M/UL (ref 4.7–6.1)
SODIUM SERPL-SCNC: 139 MMOL/L (ref 135–145)
TSH SERPL DL<=0.005 MIU/L-ACNC: 0.57 UIU/ML (ref 0.38–5.33)
WBC # BLD AUTO: 8.2 K/UL (ref 4.8–10.8)

## 2019-01-15 PROCEDURE — 90732 PPSV23 VACC 2 YRS+ SUBQ/IM: CPT | Performed by: HOSPITALIST

## 2019-01-15 PROCEDURE — 90471 IMMUNIZATION ADMIN: CPT

## 2019-01-15 PROCEDURE — 770020 HCHG ROOM/CARE - TELE (206)

## 2019-01-15 PROCEDURE — 700111 HCHG RX REV CODE 636 W/ 250 OVERRIDE (IP): Performed by: HOSPITALIST

## 2019-01-15 PROCEDURE — 700105 HCHG RX REV CODE 258: Performed by: INTERNAL MEDICINE

## 2019-01-15 PROCEDURE — 85018 HEMOGLOBIN: CPT | Mod: 91

## 2019-01-15 PROCEDURE — 90686 IIV4 VACC NO PRSV 0.5 ML IM: CPT | Performed by: HOSPITALIST

## 2019-01-15 PROCEDURE — 700102 HCHG RX REV CODE 250 W/ 637 OVERRIDE(OP): Performed by: STUDENT IN AN ORGANIZED HEALTH CARE EDUCATION/TRAINING PROGRAM

## 2019-01-15 PROCEDURE — 36415 COLL VENOUS BLD VENIPUNCTURE: CPT

## 2019-01-15 PROCEDURE — C9113 INJ PANTOPRAZOLE SODIUM, VIA: HCPCS | Performed by: INTERNAL MEDICINE

## 2019-01-15 PROCEDURE — 700111 HCHG RX REV CODE 636 W/ 250 OVERRIDE (IP): Performed by: INTERNAL MEDICINE

## 2019-01-15 PROCEDURE — A9270 NON-COVERED ITEM OR SERVICE: HCPCS | Performed by: STUDENT IN AN ORGANIZED HEALTH CARE EDUCATION/TRAINING PROGRAM

## 2019-01-15 PROCEDURE — 99233 SBSQ HOSP IP/OBS HIGH 50: CPT | Performed by: HOSPITALIST

## 2019-01-15 RX ORDER — OMEPRAZOLE 20 MG/1
40 CAPSULE, DELAYED RELEASE ORAL 2 TIMES DAILY
Status: DISCONTINUED | OUTPATIENT
Start: 2019-01-15 | End: 2019-01-16 | Stop reason: HOSPADM

## 2019-01-15 RX ADMIN — PNEUMOCOCCAL VACCINE POLYVALENT 25 MCG
25; 25; 25; 25; 25; 25; 25; 25; 25; 25; 25; 25; 25; 25; 25; 25; 25; 25; 25; 25; 25; 25; 25 INJECTION, SOLUTION INTRAMUSCULAR; SUBCUTANEOUS at 06:05

## 2019-01-15 RX ADMIN — SODIUM CHLORIDE, POTASSIUM CHLORIDE, SODIUM LACTATE AND CALCIUM CHLORIDE: 600; 310; 30; 20 INJECTION, SOLUTION INTRAVENOUS at 09:57

## 2019-01-15 RX ADMIN — INFLUENZA A VIRUS A/MICHIGAN/45/2015 X-275 (H1N1) ANTIGEN (FORMALDEHYDE INACTIVATED), INFLUENZA A VIRUS A/SINGAPORE/INFIMH-16-0019/2016 IVR-186 (H3N2) ANTIGEN (FORMALDEHYDE INACTIVATED), INFLUENZA B VIRUS B/PHUKET/3073/2013 ANTIGEN (FORMALDEHYDE INACTIVATED), AND INFLUENZA B VIRUS B/MARYLAND/15/2016 BX-69A ANTIGEN (FORMALDEHYDE INACTIVATED) 0.5 ML: 15; 15; 15; 15 INJECTION, SUSPENSION INTRAMUSCULAR at 05:09

## 2019-01-15 RX ADMIN — PANTOPRAZOLE SODIUM 40 MG: 40 INJECTION, POWDER, LYOPHILIZED, FOR SOLUTION INTRAVENOUS at 05:09

## 2019-01-15 RX ADMIN — OMEPRAZOLE 40 MG: 20 CAPSULE, DELAYED RELEASE ORAL at 18:00

## 2019-01-15 ASSESSMENT — ENCOUNTER SYMPTOMS
HEARTBURN: 0
SHORTNESS OF BREATH: 0
PHOTOPHOBIA: 0
SPUTUM PRODUCTION: 0
BLURRED VISION: 0
ABDOMINAL PAIN: 0
COUGH: 0
PND: 0
NAUSEA: 0
PALPITATIONS: 0
WEIGHT LOSS: 0
HEMOPTYSIS: 0
VOMITING: 0
DIARRHEA: 0
FEVER: 0
CHILLS: 0
CONSTIPATION: 0
ORTHOPNEA: 0
BLOOD IN STOOL: 0

## 2019-01-15 ASSESSMENT — PAIN SCALES - GENERAL
PAINLEVEL_OUTOF10: 0

## 2019-01-15 NOTE — PROGRESS NOTES
[facility] Medicine Daily Progress Note    Date of Service  1/14/2019    Chief Complaint  48 y.o. male admitted 1/13/2019 with past medical history of obesity, hypertension, hyperlipidemia and tobacco abuse who presented 1/13/2019 with melena and hematemesis. He is a smoker and drinks ETOH. Denies NSAID use or blood thinners.    Hospital Course    Upon arrival to the ED his vital signs were stable. Pt was started on protonix gtt and given 1 U of PRBC. EKG found NSR with no ST changes read by me.         Interval Problem Update  1/14: S/P EGD that found ulcerated esophagitis. Placed on IV protonix. Hb stable today at 10.8. Will continue to trend.     Consultants/Specialty  GI    Code Status  Full    Disposition  Home    Review of Systems  Review of Systems   Constitutional: Negative for chills, fever and weight loss.   HENT: Negative for congestion, nosebleeds and tinnitus.    Eyes: Negative for blurred vision and photophobia.   Respiratory: Negative for cough, hemoptysis and sputum production.    Cardiovascular: Negative for chest pain, palpitations, orthopnea, leg swelling and PND.   Gastrointestinal: Positive for abdominal pain, blood in stool, heartburn, melena, nausea and vomiting. Negative for constipation and diarrhea.   Genitourinary: Negative for dysuria and urgency.   Skin: Negative for rash.        Physical Exam  Temp:  [36.1 °C (96.9 °F)-37.4 °C (99.3 °F)] 36.6 °C (97.8 °F)  Pulse:  [77-99] 77  Resp:  [13-21] 21  BP: (105-138)/(70-88) 134/87    Physical Exam   Constitutional: He is oriented to person, place, and time. He appears well-developed and well-nourished.   HENT:   Head: Normocephalic and atraumatic.   Eyes: No scleral icterus.   Neck: No JVD present.   Cardiovascular: Normal rate and regular rhythm.  Exam reveals no gallop and no friction rub.    No murmur heard.  Pulmonary/Chest: Effort normal and breath sounds normal. No respiratory distress. He has no wheezes. He has no rales. He exhibits no  tenderness.   Abdominal: Soft. Bowel sounds are normal. He exhibits no distension. There is no tenderness. There is no rebound and no guarding.   Lymphadenopathy:     He has no cervical adenopathy.   Neurological: He is alert and oriented to person, place, and time.       Fluids    Intake/Output Summary (Last 24 hours) at 01/14/19 1653  Last data filed at 01/14/19 1531   Gross per 24 hour   Intake          1283.33 ml   Output              500 ml   Net           783.33 ml       Laboratory  Recent Labs      01/13/19   2339  01/14/19   0704   WBC  12.1*   --    RBC  3.29*   --    HEMOGLOBIN  10.3*  10.1*   HEMATOCRIT  30.0*   --    MCV  91.2   --    MCH  31.3   --    MCHC  34.3   --    RDW  46.2   --    PLATELETCT  239   --    MPV  10.2   --      Recent Labs      01/13/19   2339   SODIUM  139   POTASSIUM  3.5*   CHLORIDE  107   CO2  24   GLUCOSE  94   BUN  63*   CREATININE  1.20   CALCIUM  8.4*     Recent Labs      01/13/19   2339   APTT  27.5   INR  1.12               Imaging  No orders to display        Assessment/Plan  GI bleed- (present on admission)   Assessment & Plan    With hematemesis and melena, likely upper GI source  Continuous cardiac monitoring  Monitor H&H every 8 hours, transfuse for hemoglobin less than 7- monitor for bleeding  S/P EGD that found no ulcer and pt had severe esophagitis  On IV protonix       Esophagitis   Assessment & Plan    Cont protonix     Anemia- (present on admission)   Assessment & Plan    Secondary to GI bleed  Monitor CBC, transfuse for hemoglobin less than 7       Leukocytosis- (present on admission)   Assessment & Plan    No obvious evidence of infection at this time, likely leukemoid reaction  Monitor CBC and vitals     Obesity- (present on admission)   Assessment & Plan    Body mass index is 44.48 kg/m².  Pt educated on the increase of morbidity and mortality associated with excess weight including DM, Heart Disease, HTN, stroke, and sleep apnea.  Pt advised weight loss of  5% through reduced calorie, low carb diet and 150 mins of exercise a week       Tobacco abuse- (present on admission)   Assessment & Plan    Tobacco cessation education provided for more than 10 minutes, discussed options of nicotine patch, medical treatment with wellbutrin and chantix. Discussed the risks of smoking including increased risk of heart disease, stroke, cancer and COPD. Discussed the benefits of quitting smoking. Nicotine replacement protocol will be provided to the patient.            VTE prophylaxis: SCD

## 2019-01-15 NOTE — CARE PLAN
Problem: Safety  Goal: Will remain free from falls  Outcome: PROGRESSING AS EXPECTED  Patient educated on patient safety and fall precautions. Patient verbalized and demonstrated understanding of education. Patient will use call light for assistance.    Problem: Infection  Goal: Will remain free from infection  Outcome: PROGRESSING AS EXPECTED   Implement standard precautions and perform hand washing before and after patient contact. RN will follow protocols and necessary steps to minimize the spread of infection. RN educated pt and and any visitors on proper hand hygiene.

## 2019-01-15 NOTE — CARE PLAN
Problem: Communication  Goal: The ability to communicate needs accurately and effectively will improve  Outcome: PROGRESSING AS EXPECTED      Problem: Safety  Goal: Will remain free from injury  Outcome: PROGRESSING AS EXPECTED    Goal: Will remain free from falls  Outcome: PROGRESSING AS EXPECTED      Problem: Infection  Goal: Will remain free from infection  Outcome: PROGRESSING AS EXPECTED      Problem: Venous Thromboembolism (VTW)/Deep Vein Thrombosis (DVT) Prevention:  Goal: Patient will participate in Venous Thrombosis (VTE)/Deep Vein Thrombosis (DVT)Prevention Measures  Outcome: PROGRESSING AS EXPECTED      Problem: Bowel/Gastric:  Goal: Normal bowel function is maintained or improved  Outcome: PROGRESSING AS EXPECTED    Goal: Will not experience complications related to bowel motility  Outcome: PROGRESSING AS EXPECTED      Problem: Knowledge Deficit  Goal: Knowledge of disease process/condition, treatment plan, diagnostic tests, and medications will improve  Outcome: PROGRESSING AS EXPECTED    Goal: Knowledge of the prescribed therapeutic regimen will improve  Outcome: PROGRESSING AS EXPECTED      Problem: Discharge Barriers/Planning  Goal: Patient's continuum of care needs will be met  Outcome: PROGRESSING AS EXPECTED      Problem: Fluid Volume:  Goal: Will maintain balanced intake and output  Outcome: PROGRESSING AS EXPECTED      Problem: Respiratory:  Goal: Respiratory status will improve  Outcome: PROGRESSING AS EXPECTED

## 2019-01-15 NOTE — PROGRESS NOTES
Pt back on the floor. VSS, pt is in SR. Assessment complete. A&O x 4. No signs of distress noted at this time. Tele monitor in place, monitor room notified. Pt denies pain. Fall precaution in place and appropriate signs in place. Call light within reach. Bed is locked and in the lowest position. Pt is educated regarding fall precautions and importance of calling for assistance. Pt denies any additional needs at this time. Will continue to monitor.

## 2019-01-15 NOTE — PROGRESS NOTES
GASTROENTEROLOGY CONSULTANT NOTE      Interval Problem Daily Status Update  (24 hours, problem oriented, brief subjective history, new lab/imaging data pertinent to that problem)   Denies any more episodes of emesis or dark stools  C/o blood in urine  Able to tolerate a full liquid diet- advanced to GI soft diet  Changed PPI to oral bid doses  EGD yesterday showed severe reflux esophagitis  Esophageal bx showed mild reactive changes with chronic inflammation. No malignancy or dysplasia    Review of Systems   Constitutional: Negative for chills and fever.   Respiratory: Negative for shortness of breath.    Cardiovascular: Positive for leg swelling. Negative for chest pain.   Gastrointestinal: Negative for abdominal pain, blood in stool, nausea and vomiting.       PCP: No primary care provider on file.      Quality Measures  Quality-Core Measures   Reviewed items::  Labs reviewed and Medications reviewed      Physical Exam       Vitals:    01/14/19 2130 01/15/19 0051 01/15/19 0411 01/15/19 0800   BP: 132/82 125/80 136/86 146/82   Pulse: 86 86 90 89   Resp: 18 17 18 18   Temp: 37.6 °C (99.7 °F) 36.8 °C (98.2 °F) 37.1 °C (98.8 °F) 37.2 °C (98.9 °F)   TempSrc: Temporal Temporal Temporal Temporal   SpO2: 93% 96% 95% 96%   Weight: (!) 140.4 kg (309 lb 8.4 oz)      Height:         Body mass index is 49.96 kg/m². Weight: (!) 140.4 kg (309 lb 8.4 oz)  Oxygen Therapy:  Pulse Oximetry: 96 %, O2 (LPM): 0, O2 Delivery: None (Room Air)    Physical Exam   Constitutional: He is oriented to person, place, and time and well-developed, well-nourished, and in no distress.   HENT:   Head: Normocephalic and atraumatic.   Eyes: Pupils are equal, round, and reactive to light.   Neck: Normal range of motion.   Cardiovascular: Normal rate and regular rhythm.    Pulmonary/Chest: Effort normal and breath sounds normal.   Abdominal: Soft. He exhibits no distension. There is no tenderness.   Musculoskeletal: He exhibits edema.    Neurological: He is alert and oriented to person, place, and time.   Skin: Skin is warm.     ASSESSMENT AND PLAN:     # Upper GI bleed  # Dysphagia  # HTN  # Tobacco abuse  # Obesity     - Since admission here, patient denies any more episodes of emesis or loose stools  - Remains hemodynamically stable.   - Hemoglobin at 8.8, continue to monitor  - EGD yesterday showed severe reflux esophagitis  - Esophageal bx showed mild reactive changes with chronic inflammation. No malignancy or dysplasia  - Able to tolerate a full liquid diet- advanced to GI soft diet  - Changed IV PPI to oral bid doses  - Plan for a repeat endoscopy in 8-10 weeks of discharge to look for healing

## 2019-01-16 ENCOUNTER — APPOINTMENT (OUTPATIENT)
Dept: RADIOLOGY | Facility: MEDICAL CENTER | Age: 49
DRG: 378 | End: 2019-01-16
Attending: HOSPITALIST
Payer: COMMERCIAL

## 2019-01-16 ENCOUNTER — HOSPITAL ENCOUNTER (OUTPATIENT)
Facility: MEDICAL CENTER | Age: 49
End: 2019-01-17
Attending: EMERGENCY MEDICINE | Admitting: HOSPITALIST
Payer: COMMERCIAL

## 2019-01-16 VITALS
WEIGHT: 309.31 LBS | HEIGHT: 66 IN | RESPIRATION RATE: 18 BRPM | TEMPERATURE: 98.1 F | HEART RATE: 153 BPM | OXYGEN SATURATION: 98 % | DIASTOLIC BLOOD PRESSURE: 81 MMHG | BODY MASS INDEX: 49.71 KG/M2 | SYSTOLIC BLOOD PRESSURE: 140 MMHG

## 2019-01-16 DIAGNOSIS — R31.9 HEMATURIA, UNSPECIFIED TYPE: ICD-10-CM

## 2019-01-16 PROBLEM — D72.829 LEUKOCYTOSIS: Status: RESOLVED | Noted: 2019-01-14 | Resolved: 2019-01-16

## 2019-01-16 PROBLEM — I47.19 AVNRT (AV NODAL RE-ENTRY TACHYCARDIA) (HCC): Chronic | Status: ACTIVE | Noted: 2019-01-16

## 2019-01-16 PROBLEM — I47.19 AVNRT (AV NODAL RE-ENTRY TACHYCARDIA) (HCC): Chronic | Status: RESOLVED | Noted: 2019-01-16 | Resolved: 2019-01-16

## 2019-01-16 PROBLEM — K92.2 GI BLEED: Status: RESOLVED | Noted: 2019-01-14 | Resolved: 2019-01-16

## 2019-01-16 LAB
ANION GAP SERPL CALC-SCNC: 8 MMOL/L (ref 0–11.9)
APPEARANCE UR: CLEAR
APTT PPP: 28 SEC (ref 24.7–36)
BACTERIA #/AREA URNS HPF: NEGATIVE /HPF
BASOPHILS # BLD AUTO: 0.2 % (ref 0–1.8)
BASOPHILS # BLD AUTO: 0.3 % (ref 0–1.8)
BASOPHILS # BLD AUTO: 0.6 % (ref 0–1.8)
BASOPHILS # BLD: 0.02 K/UL (ref 0–0.12)
BASOPHILS # BLD: 0.03 K/UL (ref 0–0.12)
BASOPHILS # BLD: 0.06 K/UL (ref 0–0.12)
BILIRUB UR QL STRIP.AUTO: NEGATIVE
BUN SERPL-MCNC: 13 MG/DL (ref 8–22)
CALCIUM SERPL-MCNC: 7.9 MG/DL (ref 8.5–10.5)
CHLORIDE SERPL-SCNC: 108 MMOL/L (ref 96–112)
CK MB SERPL-MCNC: 1.4 NG/ML (ref 0–5)
CK SERPL-CCNC: 76 U/L (ref 0–154)
CO2 SERPL-SCNC: 26 MMOL/L (ref 20–33)
COLOR UR: YELLOW
CREAT SERPL-MCNC: 0.98 MG/DL (ref 0.5–1.4)
D DIMER PPP IA.FEU-MCNC: 0.88 UG/ML (FEU) (ref 0–0.5)
EKG IMPRESSION: NORMAL
EOSINOPHIL # BLD AUTO: 0.14 K/UL (ref 0–0.51)
EOSINOPHIL # BLD AUTO: 0.18 K/UL (ref 0–0.51)
EOSINOPHIL # BLD AUTO: 0.26 K/UL (ref 0–0.51)
EOSINOPHIL NFR BLD: 1.4 % (ref 0–6.9)
EOSINOPHIL NFR BLD: 2 % (ref 0–6.9)
EOSINOPHIL NFR BLD: 2.6 % (ref 0–6.9)
EPI CELLS #/AREA URNS HPF: NEGATIVE /HPF
ERYTHROCYTE [DISTWIDTH] IN BLOOD BY AUTOMATED COUNT: 48.9 FL (ref 35.9–50)
ERYTHROCYTE [DISTWIDTH] IN BLOOD BY AUTOMATED COUNT: 49.1 FL (ref 35.9–50)
ERYTHROCYTE [DISTWIDTH] IN BLOOD BY AUTOMATED COUNT: 49.5 FL (ref 35.9–50)
GLUCOSE SERPL-MCNC: 106 MG/DL (ref 65–99)
GLUCOSE UR STRIP.AUTO-MCNC: NEGATIVE MG/DL
HCT VFR BLD AUTO: 24.7 % (ref 42–52)
HCT VFR BLD AUTO: 27.1 % (ref 42–52)
HCT VFR BLD AUTO: 27.3 % (ref 42–52)
HGB BLD-MCNC: 8.3 G/DL (ref 14–18)
HGB BLD-MCNC: 9 G/DL (ref 14–18)
HGB BLD-MCNC: 9.1 G/DL (ref 14–18)
HYALINE CASTS #/AREA URNS LPF: ABNORMAL /LPF
IMM GRANULOCYTES # BLD AUTO: 0.09 K/UL (ref 0–0.11)
IMM GRANULOCYTES # BLD AUTO: 0.09 K/UL (ref 0–0.11)
IMM GRANULOCYTES # BLD AUTO: 0.16 K/UL (ref 0–0.11)
IMM GRANULOCYTES NFR BLD AUTO: 0.9 % (ref 0–0.9)
IMM GRANULOCYTES NFR BLD AUTO: 1 % (ref 0–0.9)
IMM GRANULOCYTES NFR BLD AUTO: 1.6 % (ref 0–0.9)
INR PPP: 1 (ref 0.87–1.13)
KETONES UR STRIP.AUTO-MCNC: NEGATIVE MG/DL
LACTATE BLD-SCNC: 1.5 MMOL/L (ref 0.5–2)
LEUKOCYTE ESTERASE UR QL STRIP.AUTO: ABNORMAL
LYMPHOCYTES # BLD AUTO: 1.74 K/UL (ref 1–4.8)
LYMPHOCYTES # BLD AUTO: 1.76 K/UL (ref 1–4.8)
LYMPHOCYTES # BLD AUTO: 1.86 K/UL (ref 1–4.8)
LYMPHOCYTES NFR BLD: 17.2 % (ref 22–41)
LYMPHOCYTES NFR BLD: 18.6 % (ref 22–41)
LYMPHOCYTES NFR BLD: 19.7 % (ref 22–41)
MAGNESIUM SERPL-MCNC: 2.4 MG/DL (ref 1.5–2.5)
MCH RBC QN AUTO: 31 PG (ref 27–33)
MCH RBC QN AUTO: 31.4 PG (ref 27–33)
MCH RBC QN AUTO: 31.7 PG (ref 27–33)
MCHC RBC AUTO-ENTMCNC: 33.2 G/DL (ref 33.7–35.3)
MCHC RBC AUTO-ENTMCNC: 33.3 G/DL (ref 33.7–35.3)
MCHC RBC AUTO-ENTMCNC: 33.6 G/DL (ref 33.7–35.3)
MCV RBC AUTO: 93.4 FL (ref 81.4–97.8)
MCV RBC AUTO: 94.1 FL (ref 81.4–97.8)
MCV RBC AUTO: 94.3 FL (ref 81.4–97.8)
MICRO URNS: ABNORMAL
MONOCYTES # BLD AUTO: 0.71 K/UL (ref 0–0.85)
MONOCYTES # BLD AUTO: 0.74 K/UL (ref 0–0.85)
MONOCYTES # BLD AUTO: 0.87 K/UL (ref 0–0.85)
MONOCYTES NFR BLD AUTO: 7 % (ref 0–13.4)
MONOCYTES NFR BLD AUTO: 8.3 % (ref 0–13.4)
MONOCYTES NFR BLD AUTO: 8.7 % (ref 0–13.4)
NEUTROPHILS # BLD AUTO: 6.15 K/UL (ref 1.82–7.42)
NEUTROPHILS # BLD AUTO: 6.79 K/UL (ref 1.82–7.42)
NEUTROPHILS # BLD AUTO: 7.43 K/UL (ref 1.82–7.42)
NEUTROPHILS NFR BLD: 67.9 % (ref 44–72)
NEUTROPHILS NFR BLD: 68.7 % (ref 44–72)
NEUTROPHILS NFR BLD: 73.3 % (ref 44–72)
NITRITE UR QL STRIP.AUTO: NEGATIVE
NRBC # BLD AUTO: 0.02 K/UL
NRBC # BLD AUTO: 0.03 K/UL
NRBC # BLD AUTO: 0.05 K/UL
NRBC BLD-RTO: 0.2 /100 WBC
NRBC BLD-RTO: 0.3 /100 WBC
NRBC BLD-RTO: 0.5 /100 WBC
PH UR STRIP.AUTO: 7 [PH]
PHOSPHATE SERPL-MCNC: 3.6 MG/DL (ref 2.5–4.5)
PLATELET # BLD AUTO: 192 K/UL (ref 164–446)
PLATELET # BLD AUTO: 204 K/UL (ref 164–446)
PLATELET # BLD AUTO: 232 K/UL (ref 164–446)
PMV BLD AUTO: 9.4 FL (ref 9–12.9)
PMV BLD AUTO: 9.8 FL (ref 9–12.9)
PMV BLD AUTO: 9.9 FL (ref 9–12.9)
POTASSIUM SERPL-SCNC: 3.6 MMOL/L (ref 3.6–5.5)
PROT UR QL STRIP: NEGATIVE MG/DL
PROTHROMBIN TIME: 13.3 SEC (ref 12–14.6)
RBC # BLD AUTO: 2.62 M/UL (ref 4.7–6.1)
RBC # BLD AUTO: 2.9 M/UL (ref 4.7–6.1)
RBC # BLD AUTO: 2.9 M/UL (ref 4.7–6.1)
RBC # URNS HPF: >150 /HPF
RBC UR QL AUTO: ABNORMAL
SODIUM SERPL-SCNC: 142 MMOL/L (ref 135–145)
SP GR UR STRIP.AUTO: 1.02
TROPONIN I SERPL-MCNC: <0.01 NG/ML (ref 0–0.04)
TROPONIN I SERPL-MCNC: <0.01 NG/ML (ref 0–0.04)
UROBILINOGEN UR STRIP.AUTO-MCNC: 1 MG/DL
WBC # BLD AUTO: 10 K/UL (ref 4.8–10.8)
WBC # BLD AUTO: 10.1 K/UL (ref 4.8–10.8)
WBC # BLD AUTO: 9 K/UL (ref 4.8–10.8)
WBC #/AREA URNS HPF: ABNORMAL /HPF

## 2019-01-16 PROCEDURE — 36415 COLL VENOUS BLD VENIPUNCTURE: CPT

## 2019-01-16 PROCEDURE — 700117 HCHG RX CONTRAST REV CODE 255: Performed by: HOSPITALIST

## 2019-01-16 PROCEDURE — 80053 COMPREHEN METABOLIC PANEL: CPT

## 2019-01-16 PROCEDURE — 99406 BEHAV CHNG SMOKING 3-10 MIN: CPT | Performed by: INTERNAL MEDICINE

## 2019-01-16 PROCEDURE — 93010 ELECTROCARDIOGRAM REPORT: CPT | Performed by: INTERNAL MEDICINE

## 2019-01-16 PROCEDURE — 700102 HCHG RX REV CODE 250 W/ 637 OVERRIDE(OP): Performed by: STUDENT IN AN ORGANIZED HEALTH CARE EDUCATION/TRAINING PROGRAM

## 2019-01-16 PROCEDURE — 85025 COMPLETE CBC W/AUTO DIFF WBC: CPT | Mod: 91

## 2019-01-16 PROCEDURE — 700105 HCHG RX REV CODE 258: Performed by: HOSPITALIST

## 2019-01-16 PROCEDURE — 85610 PROTHROMBIN TIME: CPT

## 2019-01-16 PROCEDURE — 99285 EMERGENCY DEPT VISIT HI MDM: CPT

## 2019-01-16 PROCEDURE — 76536 US EXAM OF HEAD AND NECK: CPT

## 2019-01-16 PROCEDURE — 71045 X-RAY EXAM CHEST 1 VIEW: CPT

## 2019-01-16 PROCEDURE — 83735 ASSAY OF MAGNESIUM: CPT

## 2019-01-16 PROCEDURE — 84484 ASSAY OF TROPONIN QUANT: CPT | Mod: 91

## 2019-01-16 PROCEDURE — 99239 HOSP IP/OBS DSCHRG MGMT >30: CPT | Performed by: HOSPITALIST

## 2019-01-16 PROCEDURE — 85379 FIBRIN DEGRADATION QUANT: CPT

## 2019-01-16 PROCEDURE — 700105 HCHG RX REV CODE 258: Performed by: EMERGENCY MEDICINE

## 2019-01-16 PROCEDURE — A9270 NON-COVERED ITEM OR SERVICE: HCPCS | Performed by: STUDENT IN AN ORGANIZED HEALTH CARE EDUCATION/TRAINING PROGRAM

## 2019-01-16 PROCEDURE — 81001 URINALYSIS AUTO W/SCOPE: CPT

## 2019-01-16 PROCEDURE — 83605 ASSAY OF LACTIC ACID: CPT

## 2019-01-16 PROCEDURE — 80048 BASIC METABOLIC PNL TOTAL CA: CPT

## 2019-01-16 PROCEDURE — 85730 THROMBOPLASTIN TIME PARTIAL: CPT

## 2019-01-16 PROCEDURE — 700102 HCHG RX REV CODE 250 W/ 637 OVERRIDE(OP): Performed by: INTERNAL MEDICINE

## 2019-01-16 PROCEDURE — 82550 ASSAY OF CK (CPK): CPT

## 2019-01-16 PROCEDURE — A9270 NON-COVERED ITEM OR SERVICE: HCPCS | Performed by: INTERNAL MEDICINE

## 2019-01-16 PROCEDURE — 84100 ASSAY OF PHOSPHORUS: CPT

## 2019-01-16 PROCEDURE — 82553 CREATINE MB FRACTION: CPT

## 2019-01-16 PROCEDURE — 71275 CT ANGIOGRAPHY CHEST: CPT

## 2019-01-16 PROCEDURE — 99253 IP/OBS CNSLTJ NEW/EST LOW 45: CPT | Mod: 25 | Performed by: INTERNAL MEDICINE

## 2019-01-16 PROCEDURE — 93005 ELECTROCARDIOGRAM TRACING: CPT | Performed by: HOSPITALIST

## 2019-01-16 RX ORDER — WARFARIN SODIUM 5 MG/1
5 TABLET ORAL DAILY
Qty: 5 TAB | Refills: 0 | Status: ON HOLD | OUTPATIENT
Start: 2019-01-16 | End: 2019-01-17

## 2019-01-16 RX ORDER — METOPROLOL SUCCINATE 25 MG/1
25 TABLET, EXTENDED RELEASE ORAL
Status: DISCONTINUED | OUTPATIENT
Start: 2019-01-16 | End: 2019-01-16 | Stop reason: HOSPADM

## 2019-01-16 RX ORDER — OMEPRAZOLE 40 MG/1
40 CAPSULE, DELAYED RELEASE ORAL 2 TIMES DAILY
Qty: 90 CAP | Refills: 0 | Status: SHIPPED | OUTPATIENT
Start: 2019-01-16

## 2019-01-16 RX ORDER — SODIUM CHLORIDE 9 MG/ML
1000 INJECTION, SOLUTION INTRAVENOUS ONCE
Status: COMPLETED | OUTPATIENT
Start: 2019-01-16 | End: 2019-01-16

## 2019-01-16 RX ORDER — METOPROLOL SUCCINATE 25 MG/1
25 TABLET, EXTENDED RELEASE ORAL DAILY
Qty: 30 TAB | Refills: 3 | Status: SHIPPED | OUTPATIENT
Start: 2019-01-16 | End: 2021-03-04

## 2019-01-16 RX ORDER — SODIUM CHLORIDE 9 MG/ML
1000 INJECTION, SOLUTION INTRAVENOUS ONCE
Status: COMPLETED | OUTPATIENT
Start: 2019-01-16 | End: 2019-01-17

## 2019-01-16 RX ADMIN — SODIUM CHLORIDE 1000 ML: 9 INJECTION, SOLUTION INTRAVENOUS at 23:32

## 2019-01-16 RX ADMIN — OMEPRAZOLE 40 MG: 20 CAPSULE, DELAYED RELEASE ORAL at 04:51

## 2019-01-16 RX ADMIN — SODIUM CHLORIDE 1000 ML: 9 INJECTION, SOLUTION INTRAVENOUS at 07:58

## 2019-01-16 RX ADMIN — IOHEXOL 75 ML: 350 INJECTION, SOLUTION INTRAVENOUS at 11:39

## 2019-01-16 RX ADMIN — METOPROLOL SUCCINATE 25 MG: 25 TABLET, EXTENDED RELEASE ORAL at 15:55

## 2019-01-16 ASSESSMENT — ENCOUNTER SYMPTOMS
BLOOD IN STOOL: 0
VOMITING: 0
ABDOMINAL PAIN: 0
CHILLS: 0
SHORTNESS OF BREATH: 0
FEVER: 0
NAUSEA: 0

## 2019-01-16 ASSESSMENT — LIFESTYLE VARIABLES
ON A TYPICAL DAY WHEN YOU DRINK ALCOHOL HOW MANY DRINKS DO YOU HAVE: 2
TOTAL SCORE: 0
DO YOU DRINK ALCOHOL: YES
HAVE YOU EVER FELT YOU SHOULD CUT DOWN ON YOUR DRINKING: NO
HOW MANY TIMES IN THE PAST YEAR HAVE YOU HAD 5 OR MORE DRINKS IN A DAY: 0
EVER FELT BAD OR GUILTY ABOUT YOUR DRINKING: NO
AVERAGE NUMBER OF DAYS PER WEEK YOU HAVE A DRINK CONTAINING ALCOHOL: 1
EVER HAD A DRINK FIRST THING IN THE MORNING TO STEADY YOUR NERVES TO GET RID OF A HANGOVER: NO
CONSUMPTION TOTAL: NEGATIVE
TOTAL SCORE: 0
TOTAL SCORE: 0
HAVE PEOPLE ANNOYED YOU BY CRITICIZING YOUR DRINKING: NO

## 2019-01-16 ASSESSMENT — PAIN SCALES - GENERAL
PAINLEVEL_OUTOF10: 0
PAINLEVEL_OUTOF10: 0
PAINLEVEL_OUTOF10: 5
PAINLEVEL_OUTOF10: 5
PAINLEVEL_OUTOF10: 0

## 2019-01-16 NOTE — CARE PLAN
Problem: Communication  Goal: The ability to communicate needs accurately and effectively will improve  Outcome: PROGRESSING AS EXPECTED  Educated patient to voice any questions and concerns about plan of care. Patient verbalizes understanding.     Problem: Safety  Goal: Will remain free from falls    Intervention: Implement fall precautions  Safety precautions and fall prevention in place. Fall prevention education provided. Patient verbalized understanding. Bed in low locked position, bed alarm on, treaded socks on patient, call bell within reach. Patient calls appropriately as needed.

## 2019-01-16 NOTE — CODE DOCUMENTATION
Pt taken to CT and US with primary RN. A+Ox4, denies pain. Metoprolol requested by RT RN for rate control. MD states he would like to wait for CT results before considering further interventions. -120's.

## 2019-01-16 NOTE — CONSULTS
Reason for Consult:  Asked by Dr Patricio Zapata M.D. to see this patient with tachycardia  Patient's PCP: No primary care provider on file.    CC:   Chief Complaint   Patient presents with   • Upper GI Bleed       HPI: This is a 48-year-old gentleman with no significant past medical history who presents with concern for upper GI bleed had endoscopy without complications was noted to have tachycardia starting this morning at a rate of 150 which is consistent with AVNRT.  On review of the patient he had an episode of palpitations sometime last year that lasted for some time and eventually went away within the day he is aware of arrhythmias as his father also has an arrhythmia which sounds like his AVNRT.  He has been smoking for the past 4-5 years on review he does not have coronary calcifications on the CT PE protocol    Medications / Drug list prior to admission:  No current facility-administered medications on file prior to encounter.      No current outpatient prescriptions on file prior to encounter.       Current list of administered Medications:    Current Facility-Administered Medications:   •  metoprolol SR (TOPROL XL) tablet 25 mg, 25 mg, Oral, Q DAY, Chris Reza M.D.  •  omeprazole (PRILOSEC) capsule 40 mg, 40 mg, Oral, BID, Qiana Mack M.D., 40 mg at 01/16/19 0451  •  Respiratory Care per Protocol, , Nebulization, Continuous RT, Edd Mckeon M.D.  •  acetaminophen (TYLENOL) tablet 650 mg, 650 mg, Oral, Q6HRS PRN, Edd Mckeon M.D.  •  ondansetron (ZOFRAN) syringe/vial injection 4 mg, 4 mg, Intravenous, Q4HRS PRN, Edd Mckeon M.D.  •  ondansetron (ZOFRAN ODT) dispertab 4 mg, 4 mg, Oral, Q4HRS PRN, Edd Mckeon M.D.  •  nicotine (NICODERM) 14 MG/24HR 14 mg, 14 mg, Transdermal, Daily-0600, 14 mg at 01/14/19 0503 **AND** Nicotine Replacement Patient Education Materials, , , Once **AND** nicotine polacrilex (NICORETTE) 2 MG piece 2 mg, 2 mg, Oral, Q HOUR PRN, Edd Mckeon M.D.  •  albuterol  inhaler 2 Puff, 2 Puff, Inhalation, Q4H PRN (RT), Chikis Mckeon M.D.    Past Medical History:   Diagnosis Date   • AVNRT (AV rashmi re-entry tachycardia) ([facility]) 1/16/2019       Past Surgical History:   Procedure Laterality Date   • GASTROSCOPY-ENDO N/A 1/14/2019    Procedure: GASTROSCOPY-ENDO;  Surgeon: Toro Reis M.D.;  Location: SURGERY [facility];  Service: Gastroenterology       Patient family history was personally reviewed, he says his father has arrhythmia likely AVNRT as well    Social History     Social History   • Marital status: Single     Spouse name: N/A   • Number of children: N/A   • Years of education: N/A     Occupational History   • Not on file.     Social History Main Topics   • Smoking status: Current Every Day Smoker     Packs/day: 1.00   • Smokeless tobacco: Never Used   • Alcohol use Yes      Comment: occ   • Drug use: No   • Sexual activity: Not on file     Other Topics Concern   • Not on file     Social History Narrative   • No narrative on file       ALLERGIES:  No Known Allergies    Review of systems:  A complete review of symptoms was reviewed with patient. This is reviewed in H&P and PMH. ALL OTHERS reviewed and negative    Physical exam:  Patient Vitals for the past 24 hrs:   BP Temp Temp src Pulse Resp SpO2 Weight   01/16/19 1215 140/81 36.7 °C (98.1 °F) Temporal (!) 153 18 98 % -   01/16/19 1112 120/87 - - (!) 156 16 96 % -   01/16/19 1103 111/79 - - (!) 158 16 95 % -   01/16/19 0845 142/86 36.9 °C (98.5 °F) Temporal (!) 145 18 97 % -   01/16/19 0627 135/87 36.2 °C (97.1 °F) Temporal (!) 145 19 96 % -   01/16/19 0357 126/84 37.7 °C (99.8 °F) Temporal 80 18 94 % -   01/16/19 0049 126/74 37.7 °C (99.8 °F) Temporal 84 16 90 % -   01/15/19 2022 142/76 36.7 °C (98 °F) Temporal 93 18 97 % (!) 140.3 kg (309 lb 4.9 oz)   01/15/19 1729 131/80 37.2 °C (98.9 °F) Temporal 94 18 90 % -       General: No acute distress.   EYES: no jaundice  HEENT: OP clear   Neck: No bruits No JVD.   CVS:  RRR. S1 + S2. No M/R/G  Resp: CTAB. No wheezing or crackles/rhonchi.  Abdomen: Soft, NT, ND,  Skin: Grossly nothing acute no obvious rashes  Neurological: Alert, Moves all extremities, no cranial nerve defects on limited exam  Extremities: Pulse 2+ in b/l LE.  no edema. No cyanosis.       Data:  Laboratory studies personally reviewed by me:  Recent Results (from the past 24 hour(s))   CBC WITH DIFFERENTIAL    Collection Time: 19  3:25 AM   Result Value Ref Range    WBC 10.1 4.8 - 10.8 K/uL    RBC 2.62 (L) 4.70 - 6.10 M/uL    Hemoglobin 8.3 (L) 14.0 - 18.0 g/dL    Hematocrit 24.7 (L) 42.0 - 52.0 %    MCV 94.3 81.4 - 97.8 fL    MCH 31.7 27.0 - 33.0 pg    MCHC 33.6 (L) 33.7 - 35.3 g/dL    RDW 49.1 35.9 - 50.0 fL    Platelet Count 192 164 - 446 K/uL    MPV 9.9 9.0 - 12.9 fL    Neutrophils-Polys 73.30 (H) 44.00 - 72.00 %    Lymphocytes 17.20 (L) 22.00 - 41.00 %    Monocytes 7.00 0.00 - 13.40 %    Eosinophils 1.40 0.00 - 6.90 %    Basophils 0.20 0.00 - 1.80 %    Immature Granulocytes 0.90 0.00 - 0.90 %    Nucleated RBC 0.20 /100 WBC    Neutrophils (Absolute) 7.43 (H) 1.82 - 7.42 K/uL    Lymphs (Absolute) 1.74 1.00 - 4.80 K/uL    Monos (Absolute) 0.71 0.00 - 0.85 K/uL    Eos (Absolute) 0.14 0.00 - 0.51 K/uL    Baso (Absolute) 0.02 0.00 - 0.12 K/uL    Immature Granulocytes (abs) 0.09 0.00 - 0.11 K/uL    NRBC (Absolute) 0.02 K/uL   EKG    Collection Time: 19  6:45 AM   Result Value Ref Range    Report       [facility]    Test Date:  2019  Pt Name:    YONAS HAMPTON              Department: 171  MRN:        [id number]                      Room:       [facility]  Gender:     Male                         Technician: WON  :        1970                   Requested By:SANG CLAUDIO  Order #:    [id number]                    Reading MD:    Measurements  Intervals                                Axis  Rate:       144                          P:          0  SC:         236                          QRS:         -11  QRSD:       100                          T:          -2  QT:         312  QTc:        483    Interpretive Statements  SINUS TACHYCARDIA  FIRST DEGREE AV BLOCK  LEFT ATRIAL ABNORMALITY  EARLY PRECORDIAL R/S TRANSITION  PROBABLE INFERIOR INFARCT, AGE INDETERMINATE  NONSPECIFIC T ABNORMALITIES, ANTERIOR LEADS  BORDERLINE PROLONGED QT INTERVAL  BASELINE WANDER IN LEAD(S) V1  Compared to ECG 01/14/2019 14:19:01  First degree AV block now present  Atrial abnormality now present  My ocardial infarct finding now present  T-wave abnormality now present  Sinus rhythm no longer present     TROPONIN    Collection Time: 01/16/19  8:10 AM   Result Value Ref Range    Troponin I <0.01 0.00 - 0.04 ng/mL   CKMB    Collection Time: 01/16/19  8:10 AM   Result Value Ref Range    CK-Mb 1.4 0.0 - 5.0 ng/mL   CREATINE KINASE    Collection Time: 01/16/19  8:10 AM   Result Value Ref Range    CPK Total 76 0 - 154 U/L   D-DIMER    Collection Time: 01/16/19  8:10 AM   Result Value Ref Range    D-Dimer Screen 0.88 (H) 0.00 - 0.50 ug/mL (FEU)   CBC WITH DIFFERENTIAL    Collection Time: 01/16/19 12:48 PM   Result Value Ref Range    WBC 9.0 4.8 - 10.8 K/uL    RBC 2.90 (L) 4.70 - 6.10 M/uL    Hemoglobin 9.0 (L) 14.0 - 18.0 g/dL    Hematocrit 27.1 (L) 42.0 - 52.0 %    MCV 93.4 81.4 - 97.8 fL    MCH 31.0 27.0 - 33.0 pg    MCHC 33.2 (L) 33.7 - 35.3 g/dL    RDW 48.9 35.9 - 50.0 fL    Platelet Count 204 164 - 446 K/uL    MPV 9.4 9.0 - 12.9 fL    Neutrophils-Polys 68.70 44.00 - 72.00 %    Lymphocytes 19.70 (L) 22.00 - 41.00 %    Monocytes 8.30 0.00 - 13.40 %    Eosinophils 2.00 0.00 - 6.90 %    Basophils 0.30 0.00 - 1.80 %    Immature Granulocytes 1.00 (H) 0.00 - 0.90 %    Nucleated RBC 0.30 /100 WBC    Neutrophils (Absolute) 6.15 1.82 - 7.42 K/uL    Lymphs (Absolute) 1.76 1.00 - 4.80 K/uL    Monos (Absolute) 0.74 0.00 - 0.85 K/uL    Eos (Absolute) 0.18 0.00 - 0.51 K/uL    Baso (Absolute) 0.03 0.00 - 0.12 K/uL    Immature Granulocytes (abs) 0.09 0.00  - 0.11 K/uL    NRBC (Absolute) 0.03 K/uL   MAGNESIUM    Collection Time: 01/16/19 12:48 PM   Result Value Ref Range    Magnesium 2.4 1.5 - 2.5 mg/dL   PHOSPHORUS    Collection Time: 01/16/19 12:48 PM   Result Value Ref Range    Phosphorus 3.6 2.5 - 4.5 mg/dL   BASIC METABOLIC PANEL    Collection Time: 01/16/19 12:48 PM   Result Value Ref Range    Sodium 142 135 - 145 mmol/L    Potassium 3.6 3.6 - 5.5 mmol/L    Chloride 108 96 - 112 mmol/L    Co2 26 20 - 33 mmol/L    Glucose 106 (H) 65 - 99 mg/dL    Bun 13 8 - 22 mg/dL    Creatinine 0.98 0.50 - 1.40 mg/dL    Calcium 7.9 (L) 8.5 - 10.5 mg/dL    Anion Gap 8.0 0.0 - 11.9   LACTIC ACID    Collection Time: 01/16/19 12:48 PM   Result Value Ref Range    Lactic Acid 1.5 0.5 - 2.0 mmol/L   ESTIMATED GFR    Collection Time: 01/16/19 12:48 PM   Result Value Ref Range    GFR If African American >60 >60 mL/min/1.73 m 2    GFR If Non African American >60 >60 mL/min/1.73 m 2       Imaging:  US-SOFT TISSUES OF HEAD - NECK   Final Result      Possible 4.5 x 4.0 x 2.0 cm solid mass in the region of the left parotid gland. Alternatively, this could represent an enlarged heterogeneous parotid gland. Consider further evaluation with MRI or CT of the neck soft tissues.      CT-CTA CHEST PULMONARY ARTERY W/ RECONS   Final Result      No evidence of pulmonary embolism.            DX-CHEST-PORTABLE (1 VIEW)   Final Result      No acute cardiopulmonary disease evident.              EKG : personally reviewed by me sinus rhythm on the 14th and his second EKG shows SVT with incomplete right bundle    On telemetry he clearly has a PVC which triggers SVT consistent with AVNRT with short RP seen on some of the telemetry tracings this resolved at 4:00 this afternoon    All pertinent features of laboratory and imaging reviewed including primary images where applicable      Active Problems:    GI bleed POA: Yes    Tobacco abuse POA: Yes    Obesity POA: Yes    Leukocytosis POA: Yes    Anemia POA: Yes     Esophagitis POA: Unknown    Hoffman's esophagus with dysplasia POA: Unknown    AVNRT (AV rashmi re-entry tachycardia) (HCC) (Chronic) POA: Clinically Undetermined  Resolved Problems:    * No resolved hospital problems. *      Assessment / Plan:  AVNRT -we discussed expectant management reasonable start metoprolol for suppression therapy could also take it as needed for palpitations she really of persistent palpitations he was advised to go to ER to get the adenosine should this strategy does not work in the long-term he could certainly call our office to discuss ablation    He can be safely discharged home      I spent 5 minutes of face to face counseling on the vital need for smoking cessation.  We discussed pharmacotherapy measures for quiting including nicotine replacement.        I personally discussed his case with  Dr Patricio Zapata M.D.    No future appointments.    It is my pleasure to participate in the care of Mr. Khan.  Please do not hesitate to contact me with questions or concerns.    Chris Reza MD PhD FACC  Cardiologist [facility]    1/16/2019

## 2019-01-16 NOTE — PROGRESS NOTES
GASTROENTEROLOGY CONSULTANT NOTE      Interval Problem Daily Status Update  (24 hours, problem oriented, brief subjective history, new lab/imaging data pertinent to that problem)   Denies any more episodes of emesis or dark stools  HR in 160s, c/o sob but denies dizziness/CP  Able to tolerate a regular diet  Continue oral PPI      Review of Systems   Constitutional: Negative for chills and fever.   Respiratory: Negative for shortness of breath.    Cardiovascular: Positive for leg swelling. Negative for chest pain.   Gastrointestinal: Negative for abdominal pain, blood in stool, nausea and vomiting.       PCP: No primary care provider on file.      Quality Measures  Quality-Core Measures   Reviewed items::  Labs reviewed and Medications reviewed      Physical Exam       Vitals:    01/16/19 0627 01/16/19 0845 01/16/19 1103 01/16/19 1112   BP: 135/87 142/86 111/79 120/87   Pulse: (!) 145 (!) 145 (!) 158 (!) 156   Resp: 19 18 16 16   Temp: 36.2 °C (97.1 °F) 36.9 °C (98.5 °F)     TempSrc: Temporal Temporal     SpO2: 96% 97% 95% 96%   Weight:       Height:         Body mass index is 49.92 kg/m². Weight: (!) 140.3 kg (309 lb 4.9 oz)  Oxygen Therapy:  Pulse Oximetry: 96 %, O2 (LPM): 3, O2 Delivery: Silicone Nasal Cannula    Physical Exam   Constitutional: He is oriented to person, place, and time and well-developed, well-nourished, and in no distress.   HENT:   Head: Normocephalic and atraumatic.   Eyes: Pupils are equal, round, and reactive to light.   Neck: Normal range of motion.   Cardiovascular: Normal rate and regular rhythm.    Pulmonary/Chest: Effort normal and breath sounds normal.   Abdominal: Soft. He exhibits no distension. There is no tenderness.   Musculoskeletal: He exhibits edema.   Neurological: He is alert and oriented to person, place, and time.   Skin: Skin is warm.     ASSESSMENT AND PLAN:     # Upper GI bleed  # Dysphagia  # HTN  # Tobacco abuse  # Obesity     - Since admission here, patient  denies any more episodes of emesis or loose stools  - Remains hemodynamically stable.   - Hemoglobin at 8.3, continue to monitor  - EGD showed severe reflux esophagitis  - Esophageal bx showed mild reactive changes with chronic inflammation. No malignancy or dysplasia  - Able to tolerate a regular diet  - Continue oral PPI  - Plan for a repeat endoscopy in 8-10 weeks of discharge to look for healing  - GI to sign off, please call with any questions

## 2019-01-16 NOTE — PROGRESS NOTES
[facility] Medicine Daily Progress Note    Date of Service  1/15/2019    Chief Complaint  48 y.o. male admitted 1/13/2019 with past medical history of obesity, hypertension, hyperlipidemia and tobacco abuse who presented 1/13/2019 with melena and hematemesis. He is a smoker and drinks ETOH. Denies NSAID use or blood thinners.    Hospital Course    Upon arrival to the ED his vital signs were stable. Pt was started on protonix gtt and given 1 U of PRBC. EKG found NSR with no ST changes read by me.         Interval Problem Update  1/14: S/P EGD that found ulcerated esophagitis. Placed on IV protonix. Hb stable today at 10.8. Will continue to trend.   1/15: H&H stable thus far, PPI transition to oral, clear liquid diet advanced to low residue.  Recheck H&H in the a.m., may be a candidate for discharge.    Consultants/Specialty  GI    Code Status  Full    Disposition  Home    Review of Systems  Review of Systems   Constitutional: Negative for chills, fever and weight loss.   HENT: Negative for congestion, nosebleeds and tinnitus.    Eyes: Negative for blurred vision and photophobia.   Respiratory: Negative for cough, hemoptysis and sputum production.    Cardiovascular: Negative for chest pain, palpitations, orthopnea, leg swelling and PND.   Gastrointestinal: Negative for abdominal pain, blood in stool, constipation, diarrhea, heartburn, melena, nausea and vomiting.   Genitourinary: Negative for dysuria and urgency.   Skin: Negative for rash.        Physical Exam  Temp:  [36.8 °C (98.2 °F)-37.6 °C (99.7 °F)] 37.2 °C (98.9 °F)  Pulse:  [] 94  Resp:  [17-20] 18  BP: (125-146)/(75-86) 131/80  SpO2:  [90 %-96 %] 90 %    Physical Exam   Constitutional: He is oriented to person, place, and time. He appears well-developed and well-nourished.   HENT:   Head: Normocephalic and atraumatic.   Eyes: No scleral icterus.   Neck: No JVD present.   Cardiovascular: Normal rate and regular rhythm.  Exam reveals no gallop and no  friction rub.    No murmur heard.  Pulmonary/Chest: Effort normal and breath sounds normal. No respiratory distress. He has no wheezes. He has no rales. He exhibits no tenderness.   Abdominal: Soft. Bowel sounds are normal. He exhibits no distension. There is no tenderness. There is no rebound and no guarding.   Lymphadenopathy:     He has no cervical adenopathy.   Neurological: He is alert and oriented to person, place, and time.       Fluids    Intake/Output Summary (Last 24 hours) at 01/15/19 1810  Last data filed at 01/15/19 1330   Gross per 24 hour   Intake             1020 ml   Output                0 ml   Net             1020 ml       Laboratory  Recent Labs      01/13/19   2339 01/14/19   2347  01/15/19   0703  01/15/19   1453   WBC  12.1*   --   8.2   --    --    RBC  3.29*   --   2.97*   --    --    HEMOGLOBIN  10.3*   < >  9.2*  8.8*  9.5*   HEMATOCRIT  30.0*   --   27.2*   --    --    MCV  91.2   --   91.6   --    --    MCH  31.3   --   31.0   --    --    MCHC  34.3   --   33.8   --    --    RDW  46.2   --   47.8   --    --    PLATELETCT  239   --   223   --    --    MPV  10.2   --   10.0   --    --     < > = values in this interval not displayed.     Recent Labs      01/13/19 2339 01/14/19 2347   SODIUM  139  139   POTASSIUM  3.5*  4.0   CHLORIDE  107  107   CO2  24  24   GLUCOSE  94  143*   BUN  63*  26*   CREATININE  1.20  0.90   CALCIUM  8.4*  8.2*     Recent Labs      01/13/19 2339   APTT  27.5   INR  1.12               Imaging  No orders to display        Assessment/Plan  GI bleed- (present on admission)   Assessment & Plan    With hematemesis and melena, likely upper GI source  Continuous cardiac monitoring  Monitor H&H every 8 hours, transfuse for hemoglobin less than 7- monitor for bleeding  S/P EGD that found no ulcer and pt had severe ulcerated esophagitis and snider esophagus  On IV protonix       Snider's esophagus with dysplasia   Assessment & Plan    Follow path report  Will  need outpatient GI follow up to monitor disease progression     Esophagitis   Assessment & Plan    Cont protonix     Anemia- (present on admission)   Assessment & Plan    Secondary to GI bleed  Monitor CBC, transfuse for hemoglobin less than 7       Leukocytosis- (present on admission)   Assessment & Plan    No obvious evidence of infection at this time, likely leukemoid reaction  Monitor CBC and vitals     Obesity- (present on admission)   Assessment & Plan    Body mass index is 44.48 kg/m².  Pt educated on the increase of morbidity and mortality associated with excess weight including DM, Heart Disease, HTN, stroke, and sleep apnea.  Pt advised weight loss of 5% through reduced calorie, low carb diet and 150 mins of exercise a week       Tobacco abuse- (present on admission)   Assessment & Plan    Tobacco cessation education provided for more than 10 minutes, discussed options of nicotine patch, medical treatment with wellbutrin and chantix. Discussed the risks of smoking including increased risk of heart disease, stroke, cancer and COPD. Discussed the benefits of quitting smoking. Nicotine replacement protocol will be provided to the patient.            VTE prophylaxis: SCD

## 2019-01-16 NOTE — PROGRESS NOTES
notified MD Zapata of 160s ST sustained. C/o of sob. 3 L nc 96%.  Denies chest pain. STAT EKG: ST, cardiac enzymes, d dimer, 1000ml ns bolus, chest xray ordered.

## 2019-01-16 NOTE — PROGRESS NOTES
Received bedside report from day shift RN. Patient is A&Ox4, no complaints of pain. Bed in lowest and locked position, call light in reach, will continue to monitor.

## 2019-01-16 NOTE — CODE DOCUMENTATION
Denies pain. Denies dyspnea. Left lower jaw swelling noted. 1L bolus given AM. -160, previous EKG from AM showed ST-SVT vs afib. MD at bedside during rapid.

## 2019-01-16 NOTE — RESPIRATORY CARE
Respiratory Rapid Response Note    Symptoms increased HR     Breath Sounds  RUL Breath Sounds: Clear (01/16/19 0800)  RML Breath Sounds: Diminished (01/16/19 0800)  RLL Breath Sounds: Diminished (01/16/19 0800)  MARCIN Breath Sounds: Clear (01/16/19 0800)  LLL Breath Sounds: Diminished (01/16/19 0800)              ABG Results N/A     O2 (LPM): 3 (01/16/19 0845)

## 2019-01-17 ENCOUNTER — APPOINTMENT (OUTPATIENT)
Dept: RADIOLOGY | Facility: MEDICAL CENTER | Age: 49
End: 2019-01-17
Attending: HOSPITALIST
Payer: COMMERCIAL

## 2019-01-17 ENCOUNTER — PATIENT OUTREACH (OUTPATIENT)
Dept: HEALTH INFORMATION MANAGEMENT | Facility: OTHER | Age: 49
End: 2019-01-17

## 2019-01-17 VITALS
HEIGHT: 66 IN | OXYGEN SATURATION: 97 % | HEART RATE: 86 BPM | TEMPERATURE: 98.8 F | DIASTOLIC BLOOD PRESSURE: 90 MMHG | BODY MASS INDEX: 49.85 KG/M2 | SYSTOLIC BLOOD PRESSURE: 160 MMHG | RESPIRATION RATE: 18 BRPM | WEIGHT: 310.19 LBS

## 2019-01-17 PROBLEM — R31.0 GROSS HEMATURIA: Status: RESOLVED | Noted: 2019-01-17 | Resolved: 2019-01-17

## 2019-01-17 PROBLEM — R31.0 GROSS HEMATURIA: Status: ACTIVE | Noted: 2019-01-17

## 2019-01-17 PROBLEM — N28.89 RIGHT KIDNEY MASS: Status: ACTIVE | Noted: 2019-01-17

## 2019-01-17 PROBLEM — I47.19 AVNRT (AV NODAL RE-ENTRY TACHYCARDIA) (HCC): Status: ACTIVE | Noted: 2019-01-17

## 2019-01-17 LAB
ALBUMIN SERPL BCP-MCNC: 3.8 G/DL (ref 3.2–4.9)
ALBUMIN/GLOB SERPL: 1.5 G/DL
ALP SERPL-CCNC: 42 U/L (ref 30–99)
ALT SERPL-CCNC: 17 U/L (ref 2–50)
ANION GAP SERPL CALC-SCNC: 6 MMOL/L (ref 0–11.9)
AST SERPL-CCNC: 8 U/L (ref 12–45)
BILIRUB SERPL-MCNC: 0.3 MG/DL (ref 0.1–1.5)
BUN SERPL-MCNC: 13 MG/DL (ref 8–22)
CALCIUM SERPL-MCNC: 8.7 MG/DL (ref 8.5–10.5)
CHLORIDE SERPL-SCNC: 106 MMOL/L (ref 96–112)
CO2 SERPL-SCNC: 28 MMOL/L (ref 20–33)
CREAT SERPL-MCNC: 0.94 MG/DL (ref 0.5–1.4)
GLOBULIN SER CALC-MCNC: 2.5 G/DL (ref 1.9–3.5)
GLUCOSE SERPL-MCNC: 99 MG/DL (ref 65–99)
POTASSIUM SERPL-SCNC: 3.8 MMOL/L (ref 3.6–5.5)
PROT SERPL-MCNC: 6.3 G/DL (ref 6–8.2)
SODIUM SERPL-SCNC: 140 MMOL/L (ref 135–145)

## 2019-01-17 PROCEDURE — 99407 BEHAV CHNG SMOKING > 10 MIN: CPT | Performed by: HOSPITALIST

## 2019-01-17 PROCEDURE — 99236 HOSP IP/OBS SAME DATE HI 85: CPT | Performed by: HOSPITALIST

## 2019-01-17 PROCEDURE — 99226 PR SUBSEQUENT OBSERVATION CARE,LEVEL III: CPT | Mod: 25 | Performed by: HOSPITALIST

## 2019-01-17 PROCEDURE — 700117 HCHG RX CONTRAST REV CODE 255: Performed by: HOSPITALIST

## 2019-01-17 PROCEDURE — G0378 HOSPITAL OBSERVATION PER HR: HCPCS

## 2019-01-17 PROCEDURE — A9270 NON-COVERED ITEM OR SERVICE: HCPCS | Performed by: HOSPITALIST

## 2019-01-17 PROCEDURE — 700102 HCHG RX REV CODE 250 W/ 637 OVERRIDE(OP): Performed by: HOSPITALIST

## 2019-01-17 PROCEDURE — 700105 HCHG RX REV CODE 258: Performed by: HOSPITALIST

## 2019-01-17 PROCEDURE — 74178 CT ABD&PLV WO CNTR FLWD CNTR: CPT

## 2019-01-17 RX ORDER — AMOXICILLIN 250 MG
2 CAPSULE ORAL 2 TIMES DAILY
Status: DISCONTINUED | OUTPATIENT
Start: 2019-01-17 | End: 2019-01-17 | Stop reason: HOSPADM

## 2019-01-17 RX ORDER — ONDANSETRON 4 MG/1
4 TABLET, ORALLY DISINTEGRATING ORAL EVERY 4 HOURS PRN
Status: DISCONTINUED | OUTPATIENT
Start: 2019-01-17 | End: 2019-01-17 | Stop reason: HOSPADM

## 2019-01-17 RX ORDER — METOPROLOL SUCCINATE 25 MG/1
25 TABLET, EXTENDED RELEASE ORAL DAILY
Status: DISCONTINUED | OUTPATIENT
Start: 2019-01-17 | End: 2019-01-17 | Stop reason: HOSPADM

## 2019-01-17 RX ORDER — BISACODYL 10 MG
10 SUPPOSITORY, RECTAL RECTAL
Status: DISCONTINUED | OUTPATIENT
Start: 2019-01-17 | End: 2019-01-17 | Stop reason: HOSPADM

## 2019-01-17 RX ORDER — PROMETHAZINE HYDROCHLORIDE 12.5 MG/1
12.5-25 SUPPOSITORY RECTAL EVERY 4 HOURS PRN
Status: DISCONTINUED | OUTPATIENT
Start: 2019-01-17 | End: 2019-01-17 | Stop reason: HOSPADM

## 2019-01-17 RX ORDER — ONDANSETRON 2 MG/ML
4 INJECTION INTRAMUSCULAR; INTRAVENOUS EVERY 4 HOURS PRN
Status: DISCONTINUED | OUTPATIENT
Start: 2019-01-17 | End: 2019-01-17 | Stop reason: HOSPADM

## 2019-01-17 RX ORDER — SODIUM CHLORIDE 9 MG/ML
INJECTION, SOLUTION INTRAVENOUS CONTINUOUS
Status: DISCONTINUED | OUTPATIENT
Start: 2019-01-17 | End: 2019-01-17 | Stop reason: HOSPADM

## 2019-01-17 RX ORDER — OMEPRAZOLE 20 MG/1
40 CAPSULE, DELAYED RELEASE ORAL 2 TIMES DAILY
Status: DISCONTINUED | OUTPATIENT
Start: 2019-01-17 | End: 2019-01-17 | Stop reason: HOSPADM

## 2019-01-17 RX ORDER — POLYETHYLENE GLYCOL 3350 17 G/17G
1 POWDER, FOR SOLUTION ORAL
Status: DISCONTINUED | OUTPATIENT
Start: 2019-01-17 | End: 2019-01-17 | Stop reason: HOSPADM

## 2019-01-17 RX ORDER — PROMETHAZINE HYDROCHLORIDE 25 MG/1
12.5-25 TABLET ORAL EVERY 4 HOURS PRN
Status: DISCONTINUED | OUTPATIENT
Start: 2019-01-17 | End: 2019-01-17 | Stop reason: HOSPADM

## 2019-01-17 RX ADMIN — SODIUM CHLORIDE: 9 INJECTION, SOLUTION INTRAVENOUS at 01:29

## 2019-01-17 RX ADMIN — IOHEXOL 100 ML: 350 INJECTION, SOLUTION INTRAVENOUS at 09:57

## 2019-01-17 RX ADMIN — METOPROLOL SUCCINATE 25 MG: 25 TABLET, EXTENDED RELEASE ORAL at 04:34

## 2019-01-17 RX ADMIN — OMEPRAZOLE 40 MG: 20 CAPSULE, DELAYED RELEASE ORAL at 04:34

## 2019-01-17 ASSESSMENT — ENCOUNTER SYMPTOMS
WEAKNESS: 0
MYALGIAS: 0
SENSORY CHANGE: 0
COUGH: 0
PALPITATIONS: 0
NAUSEA: 0
BRUISES/BLEEDS EASILY: 0
FOCAL WEAKNESS: 0
ABDOMINAL PAIN: 1
DEPRESSION: 0
DOUBLE VISION: 0
HEARTBURN: 0
BLURRED VISION: 0
HALLUCINATIONS: 0
FEVER: 0
SHORTNESS OF BREATH: 0
EYE DISCHARGE: 0
FLANK PAIN: 0
VOMITING: 0
DIZZINESS: 0
HEMOPTYSIS: 0
CHILLS: 0
SPEECH CHANGE: 0

## 2019-01-17 ASSESSMENT — COGNITIVE AND FUNCTIONAL STATUS - GENERAL
SUGGESTED CMS G CODE MODIFIER MOBILITY: CH
DAILY ACTIVITIY SCORE: 23
MOBILITY SCORE: 24
DRESSING REGULAR LOWER BODY CLOTHING: A LITTLE
SUGGESTED CMS G CODE MODIFIER DAILY ACTIVITY: CI

## 2019-01-17 ASSESSMENT — PAIN SCALES - GENERAL: PAINLEVEL_OUTOF10: 3

## 2019-01-17 ASSESSMENT — PATIENT HEALTH QUESTIONNAIRE - PHQ9
1. LITTLE INTEREST OR PLEASURE IN DOING THINGS: NOT AT ALL
2. FEELING DOWN, DEPRESSED, IRRITABLE, OR HOPELESS: NOT AT ALL
SUM OF ALL RESPONSES TO PHQ9 QUESTIONS 1 AND 2: 0

## 2019-01-17 ASSESSMENT — LIFESTYLE VARIABLES
EVER_SMOKED: YES
SUBSTANCE_ABUSE: 0

## 2019-01-17 NOTE — ED PROVIDER NOTES
ED Provider Note    CHIEF COMPLAINT  Chief Complaint   Patient presents with   • Blood in Urine     Pt was recently admitted for UGIB.  Pt states that the blood in urine began while he was admitted to the hospital, and was not addressed by the admitting team.         HPI  Jose Carlos Khan is a 48 y.o. male who presents with hematuria.  The patient states she is discharged from the hospital a couple of hours ago.  He states that when he was on his way home he noted some blood in his urine therefore he comes back to the hospital.  He states he had blood in his urine throughout the hospitalization but he states it was not worked up.  He does not take any anticoagulants.  He does not have any dysuria.  He has not had any fevers over the last 12 hours.  He does not have any associated dizziness.    REVIEW OF SYSTEMS  See HPI for further details. All other systems are negative.     PAST MEDICAL HISTORY  Past Medical History:   Diagnosis Date   • AVNRT (AV rashmi re-entry tachycardia) ([facility]) 1/16/2019       FAMILY HISTORY  [unfilled]    SOCIAL HISTORY  Social History     Social History   • Marital status: Single     Spouse name: N/A   • Number of children: N/A   • Years of education: N/A     Social History Main Topics   • Smoking status: Current Every Day Smoker     Packs/day: 1.00   • Smokeless tobacco: Never Used   • Alcohol use Yes      Comment: occ   • Drug use: No   • Sexual activity: Not on file     Other Topics Concern   • Not on file     Social History Narrative   • No narrative on file       SURGICAL HISTORY  Past Surgical History:   Procedure Laterality Date   • GASTROSCOPY-ENDO N/A 1/14/2019    Procedure: GASTROSCOPY-ENDO;  Surgeon: Toro Reis M.D.;  Location: SURGERY [facility];  Service: Gastroenterology       CURRENT MEDICATIONS  Home Medications     Reviewed by Saurav Valentin R.N. (Registered Nurse) on 01/16/19 at 2101  Med List Status: <None>   Medication Last Dose Status   metoprolol SR (TOPROL XL) 25  "MG TABLET SR 24 HR  Active   omeprazole (PRILOSEC) 40 MG delayed-release capsule  Active   warfarin (COUMADIN) 5 MG Tab  Active                ALLERGIES  No Known Allergies    PHYSICAL EXAM  VITAL SIGNS: /74   Pulse 79   Temp 36.6 °C (97.9 °F) (Temporal)   Resp 16   Ht 1.676 m (5' 6\")   Wt (!) 140 kg (308 lb 10.3 oz)   SpO2 96%   BMI 49.82 kg/m²  Room air O2: 97    Constitutional: Well developed, Well nourished, No acute distress, Non-toxic appearance.   HENT: Normocephalic, Atraumatic, Bilateral external ears normal, Oropharynx moist, No oral exudates, Nose normal.   Eyes: PERRLA, EOMI, Conjunctiva normal, No discharge.   Neck: Normal range of motion, No tenderness, Supple, No stridor.   Lymphatic: No lymphadenopathy noted.   Cardiovascular: Normal heart rate, Normal rhythm, No murmurs, No rubs, No gallops.   Thorax & Lungs: Normal breath sounds, No respiratory distress, No wheezing, No chest tenderness.   Abdomen: Bowel sounds normal, Soft, No tenderness, No masses, No pulsatile masses.   Skin: Warm, Dry, No erythema, No rash.   Back: No tenderness, No CVA tenderness.   Extremities: Intact distal pulses, No edema, No tenderness, No cyanosis, No clubbing.   Musculoskeletal: Good range of motion in all major joints. No tenderness to palpation or major deformities noted.   Neurologic: Alert & oriented x 3, Normal motor function, Normal sensory function, No focal deficits noted.   Psychiatric: Affect normal, Judgment normal, Mood normal.     COURSE & MEDICAL DECISION MAKING  Pertinent Labs & Imaging studies reviewed. (See chart for details)  This a 48-year-old male who presents the emerge department with hematuria.  His urine is packed with blood but there is no evidence of an infectious process.  He does not have any pain to support an obstructive process.  The patient is hemodynamic ly stable.  His hemoglobin is stable from discharge yesterday for the upper GI bleed.  The patient lives in [facility] [place] therefore admit the patient to the hospital overnight and repeat hemoglobin hematocrit in the morning.  If the patient continues have significant hematuria urology may need to be consulted prior to the patient going back home to [facility].    FINAL IMPRESSION  1.  Hematuria   2.  Stable anemia      Disposition  The patient will be admitted in stable condition         Electronically signed by: Eddie Barrera, 1/16/2019 10:35 PM

## 2019-01-17 NOTE — DIETARY
NUTRITION SERVICES:  Pt with BMI 50.07. Weight loss counseling not appropriate in acute care setting.     RECOMMENDATION: Referral to outpatient nutrition services for weight management after D/C.

## 2019-01-17 NOTE — PROGRESS NOTES
2 Rn skin check performed with Michael RN. Swelling and brown-red discoloration to BLE. Otherwise skin intact.

## 2019-01-17 NOTE — PROGRESS NOTES
Pt discharged to home.  Pt received a cab voucher to the airport and will then get a bus to [place].  Discussed home medications, all questions answered, follow up appointment in [place]

## 2019-01-17 NOTE — PROGRESS NOTES
Monitor summary   .20/.12/.36  150-160s sustaining ST  1405 converted to 80s SR    Cardiology consulted.

## 2019-01-17 NOTE — ED TRIAGE NOTES
"Jose Carlos Khan  48 y.o. male  Chief Complaint   Patient presents with   • Blood in Urine     Pt was recently admitted for UGIB.  Pt states that the blood in urine began while he was admitted to the hospital, and was not addressed by the admitting team.       Pt amb to triage with steady gait for above complaint.   Pt is alert and oriented, speaking in full sentences, follows commands and responds appropriately to questions. NAD. Resp are even and unlabored.  Pt placed in lobby. Pt educated on triage process. Pt encouraged to alert staff for any changes.  /92   Pulse 89   Temp 36.3 °C (97.3 °F) (Temporal)   Resp 16   Ht 1.676 m (5' 6\")   Wt (!) 140 kg (308 lb 10.3 oz)   SpO2 97%   BMI 49.82 kg/m²     "

## 2019-01-17 NOTE — ED NOTES
Chief Complaint   Patient presents with   • Blood in Urine     Pt was recently admitted for UGIB.  Pt states that the blood in urine began while he was admitted to the hospital, and was not addressed by the admitting team.       Agree with triage note, pt ambulatory to yellow 65. Pt was discharged on 1/1619 with Hoffman's esophagus with dysplasia. Pt experienced the hematuria and was very concerned that his condition has worsened and now returns for evaluation. Pt placed on cardiac monitor, bp cuff and pulse ox.

## 2019-01-17 NOTE — DISCHARGE SUMMARY
Discharge Summary    CHIEF COMPLAINT ON ADMISSION  Chief Complaint   Patient presents with   • Upper GI Bleed       Reason for Admission  EMS     Admission Date  1/13/2019    CODE STATUS  Full Code    HPI & HOSPITAL COURSE  This is a 48 y.o. male here with upper GI bleed.      For full details of admission please see the H&P of Dr. Mckeon dated 1/14/19, briefly, patient is a 48-year-old gentleman who presents with melena and hematemesis that started 1 day prior to admission.  He had several episodes of loose melanotic stools for 2 days prior to admission.  He presented to the emergency room for further evaluation after he developed hematemesis with some chills.  He drinks perhaps 1 alcoholic beverage every 3 weeks.  He was admitted to telemetry monitoring with parenteral PPI and IV hydration.  Patient was seen by the GI team, underwent EGD on hospital day 1, 1/14/19.  Follow-up hemoglobins were stable, patient did not require transfusion at any juncture.  EGD showed severe reflux esophagitis, esophageal biopsy showed mild reactive changes with chronic inflammation without malignancy or dysplasia.  Cleared for diet by the GI team.  PPI transition to the enteral route on 1/15/19.  This was well-tolerated.  Patient was to be discharged on the morning of 1/16 if H&H was stable, however at approximately 6:30 in the morning patient developed a tachycardia which was refractory to saline boluses, d-dimer was checked and elevated, CT angios obtained and unremarkable.  Cardiology consultation was obtained and the diagnosis of AVNRT was obtained.  Patient actually returned to a normal sinus rhythm before any interventions were undertaken.  Patient was discharged on a low-dose beta blockade at the recommendations of cardiology to prevent PVCs and prevent possible recurrence of his AVNRT.    Therefore, he is discharged in good and stable condition to home with close outpatient follow-up.    Patient remained inpatient for greater  than 2 midnights.    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  Chris Reza M.D.  [address]  [address]  [facility] [id number] [phone]    Call  As needed to see our doctors about the ablation to cure arrythmia or for advice on racing heart episodes        Schedule an appointment as soon as possible for a visit        MEDICATIONS ON DISCHARGE     Medication List      START taking these medications      Instructions   metoprolol SR 25 MG Tb24  Commonly known as:  TOPROL XL   Take 1 Tab by mouth every day.  Dose:  25 mg     omeprazole 40 MG delayed-release capsule  Commonly known as:  PRILOSEC   Take 1 Cap by mouth 2 times a day. For 7 days, qday thereafter.  Dose:  40 mg     warfarin 5 MG Tabs  Commonly known as:  COUMADIN   Take 1 Tab by mouth every day for 5 days. Dose will be adjusted after by the coumadin clinic.  Dose:  5 mg            Allergies  No Known Allergies    DIET  Orders Placed This Encounter   Procedures   • Diet Order Low Fiber(GI Soft)     Standing Status:   Standing     Number of Occurrences:   1     Order Specific Question:   Diet:     Answer:   Low Fiber(GI Soft) [2]       ACTIVITY  As tolerated.  Weight bearing as tolerated    CONSULTATIONS  Gastroenterology.  PROCEDURES  EGD  1.  Surgeon Dr. Toro Reis  2.  Date of procedure 1/14/19  3.  Findings are of a small hiatal hernia no obvious bleeds.  Tissue sample obtained from the esophagus.    LABORATORY  Lab Results   Component Value Date    SODIUM 142 01/16/2019    POTASSIUM 3.6 01/16/2019    CHLORIDE 108 01/16/2019    CO2 26 01/16/2019     GLUCOSE 106 (H) 01/16/2019    BUN 13 01/16/2019    CREATININE 0.98 01/16/2019        Lab Results   Component Value Date    WBC 9.0 01/16/2019    HEMOGLOBIN 9.0 (L) 01/16/2019    HEMATOCRIT 27.1 (L) 01/16/2019    PLATELETCT 204 01/16/2019        Total time of the discharge process exceeds 46 minutes.

## 2019-01-17 NOTE — DISCHARGE SUMMARY
Discharge Summary    CHIEF COMPLAINT ON ADMISSION  Chief Complaint   Patient presents with   • Blood in Urine     Pt was recently admitted for UGIB.  Pt states that the blood in urine began while he was admitted to the hospital, and was not addressed by the admitting team.       Reason for Admission  Hematuria    Admission Date  1/16/2019    CODE STATUS  Full code    HPI & HOSPITAL COURSE  This is a 48 y.o. male here with gross hematuria following a recent discharge for GI bleed and esophagitis. Other past medical history includes AV rashmi reentry tachycardia and he is also a current smoker.  Lab work revealed stable hemoglobin/hematocrit levels.  His metabolic panel was unremarkable and his coagulation panel was normal.  Urinalysis was negative for infection. A CT of his abdomen and pelvis was done and findings were consistent with a right kidney mass and left kidney cyst.  It was otherwise normal.  He was monitored on the orthopedic floor where his hematuria quickly resolved.  Findings of the CT scan were discussed with him and outpatient follow-up has been highly recommended to him.  Smoking cessation was also discussed at length and the patient states he desires to quit but is currently refusing nicotine replacement therapy.  There appears to have been an error on the original history and physical, as the patient adamantly states that he has never been on coumadin.  He is anxious to be discharged today stating that he has to catch a bus at noon.  He states he will follow-up with his primary care physician in [place] where he resides.  A referral to either urology or nephrology can be done at that point.  His vital signs and lab work have remained stable.     Therefore, he is discharged in good and stable condition to home with close outpatient follow-up.    Discharge Date  1/17/2019    FOLLOW UP ITEMS POST DISCHARGE  PCP in 1-2 weeks    DIAGNOSES  Principal Problem (Resolved):    Gross hematuria POA:  Unknown  Active Problems:    Esophagitis POA: Yes    Right kidney mass POA: Yes    Obesity POA: Yes    Anemia POA: Yes    Tobacco abuse POA: Yes    AVNRT (AV rashmi re-entry tachycardia) (HCC) POA: Yes    FOLLOW UP  Toro Reis M.D.  [address] [place] [address]  [phone]          Smita Casasrimma  [address] [facility] [address]  [phone]  Go on 1/25/2019  Please arrive at 4:00pm for your appointment. Thank you    MEDICATIONS ON DISCHARGE     Medication List      CONTINUE taking these medications      Instructions   metoprolol SR 25 MG Tb24  Commonly known as:  TOPROL XL   Take 1 Tab by mouth every day.  Dose:  25 mg     omeprazole 40 MG delayed-release capsule  Commonly known as:  PRILOSEC   Take 1 Cap by mouth 2 times a day. For 7 days, qday thereafter.  Dose:  40 mg          Allergies  No Known Allergies    DIET  Resume previous diet    ACTIVITY  As tolerated.  Exercise encouraged.    CONSULTATIONS  None    PROCEDURES  None    LABORATORY  Lab Results   Component Value Date    SODIUM 140 01/16/2019    POTASSIUM 3.8 01/16/2019    CHLORIDE 106 01/16/2019    CO2 28 01/16/2019    GLUCOSE 99 01/16/2019    BUN 13 01/16/2019    CREATININE 0.94 01/16/2019      Lab Results   Component Value Date    WBC 10.0 01/16/2019    HEMOGLOBIN 9.1 (L) 01/16/2019    HEMATOCRIT 27.3 (L) 01/16/2019    PLATELETCT 232 01/16/2019      Total time of the discharge process exceeds 45 minutes.

## 2019-01-17 NOTE — H&P
Hospital Medicine History & Physical Note    Date of Service  1/17/2019    Primary Care Physician  No primary care provider on file.    Consultants  none    Code Status  full    Chief Complaint  Hematuria gross    History of Presenting Illness  48 y.o. male who presented 1/16/2019 with gross hematuria.  This patient has a history of GI bleed was discharged earlier today on 1/16/2019.  Was found to have esophagitis by endoscopy started on omeprazole.  This patient developed acute onset gross hematuria earlier today prior to discharge.  Symptoms progressively worsened.  He is also had lower abdominal pain and cramping.  Never had hematuria like this previously.  He is a chronic smoker.  He has no known alleviating or exacerbating factors to his symptoms.  In the emergency department found to have gross blood on urinalysis stable hemoglobin will be admitted for observation.    Review of Systems  Review of Systems   Constitutional: Negative for chills and fever.   HENT: Negative for congestion, hearing loss and tinnitus.    Eyes: Negative for blurred vision, double vision and discharge.   Respiratory: Negative for cough, hemoptysis and shortness of breath.    Cardiovascular: Negative for chest pain, palpitations and leg swelling.   Gastrointestinal: Positive for abdominal pain. Negative for heartburn, nausea and vomiting.   Genitourinary: Positive for hematuria. Negative for dysuria and flank pain.   Musculoskeletal: Negative for joint pain and myalgias.   Skin: Negative for rash.   Neurological: Negative for dizziness, sensory change, speech change, focal weakness and weakness.   Endo/Heme/Allergies: Negative for environmental allergies. Does not bruise/bleed easily.   Psychiatric/Behavioral: Negative for depression, hallucinations and substance abuse.       Past Medical History   has a past medical history of AVNRT (AV rashmi re-entry tachycardia) ([facility]) (1/16/2019).    Surgical History   has a past surgical history  that includes gastroscopy-endo (N/A, 1/14/2019).     Family History  Reviewed and not pertienent    Social History   reports that he has been smoking.  He has been smoking about 1.00 pack per day. He has never used smokeless tobacco. He reports that he drinks alcohol. He reports that he does not use drugs.    Allergies  No Known Allergies    Medications  Prior to Admission Medications   Prescriptions Last Dose Informant Patient Reported? Taking?   metoprolol SR (TOPROL XL) 25 MG TABLET SR 24 HR   No No   Sig: Take 1 Tab by mouth every day.   omeprazole (PRILOSEC) 40 MG delayed-release capsule   No No   Sig: Take 1 Cap by mouth 2 times a day. For 7 days, qday thereafter.   warfarin (COUMADIN) 5 MG Tab   No No   Sig: Take 1 Tab by mouth every day for 5 days. Dose will be adjusted after by the coumadin clinic.      Facility-Administered Medications: None       Physical Exam  Temp:  [36.3 °C (97.3 °F)-36.6 °C (97.9 °F)] 36.6 °C (97.9 °F)  Pulse:  [78-89] 78  Resp:  [16-19] 16  BP: (125-150)/(74-92) 125/74  SpO2:  [94 %-97 %] 97 %    Physical Exam   Constitutional: He is oriented to person, place, and time. He appears well-developed and well-nourished. He appears distressed.   HENT:   Head: Normocephalic and atraumatic.   Eyes: Pupils are equal, round, and reactive to light. Conjunctivae and EOM are normal.   Neck: Normal range of motion. Neck supple. No JVD present.   Cardiovascular: Normal rate, regular rhythm, normal heart sounds and intact distal pulses.    No murmur heard.  Pulmonary/Chest: Effort normal and breath sounds normal. No respiratory distress. He exhibits no tenderness.   Abdominal: Soft. Bowel sounds are normal. He exhibits no distension. There is tenderness.   Musculoskeletal: Normal range of motion. He exhibits no edema.   Neurological: He is alert and oriented to person, place, and time. No cranial nerve deficit. He exhibits normal muscle tone.   Skin: Skin is warm and dry. No erythema.   Psychiatric:    impulsive   Nursing note and vitals reviewed.      Laboratory:  Recent Labs      01/16/19   0325  01/16/19   1248  01/16/19   2333   WBC  10.1  9.0  10.0   RBC  2.62*  2.90*  2.90*   HEMOGLOBIN  8.3*  9.0*  9.1*   HEMATOCRIT  24.7*  27.1*  27.3*   MCV  94.3  93.4  94.1   MCH  31.7  31.0  31.4   MCHC  33.6*  33.2*  33.3*   RDW  49.1  48.9  49.5   PLATELETCT  192  204  232   MPV  9.9  9.4  9.8     Recent Labs      01/14/19   2347  01/16/19   1248  01/16/19   2333   SODIUM  139  142  140   POTASSIUM  4.0  3.6  3.8   CHLORIDE  107  108  106   CO2  24  26  28   GLUCOSE  143*  106*  99   BUN  26*  13  13   CREATININE  0.90  0.98  0.94   CALCIUM  8.2*  7.9*  8.7     Recent Labs      01/14/19   2347  01/16/19   1248  01/16/19   2333   ALTSGPT   --    --   17   ASTSGOT   --    --   8*   ALKPHOSPHAT   --    --   42   TBILIRUBIN   --    --   0.3   GLUCOSE  143*  106*  99     Recent Labs      01/16/19   2333   APTT  28.0   INR  1.00             Recent Labs      01/16/19   0810  01/16/19   1248   TROPONINI  <0.01  <0.01       Urinalysis:    Recent Labs      01/16/19   2244   SPECGRAVITY  1.023   GLUCOSEUR  Negative   KETONES  Negative   NITRITE  Negative   LEUKESTERAS  Small*   WBCURINE  5-10*   RBCURINE  >150*   BACTERIA  Negative   EPITHELCELL  Negative        Imaging:  CT-ABDOMEN & PELVIS UROGRAM    (Results Pending)         Assessment/Plan:  I anticipate this patient is appropriate for observation status at this time.    * Gross hematuria   Assessment & Plan    Gross hematuria started today   Not on anticoagulation   Recheck hgb, hematocrit in am   Monitor for resolution of symptoms, consider urology consult in am   Check CT urogram      AVNRT (AV rashmi re-entry tachycardia) ([facility])   Assessment & Plan    Resume home BB     Esophagitis- (present on admission)   Assessment & Plan    Recent GI bleed, and scope  Discharged 1/16/2019   Continue omeprazole and GI follow up      Anemia- (present on admission)   Assessment & Plan     GI bleed discharged 1/16/2019, stable hgb   Will recheck in am   Will check lab workup      Obesity- (present on admission)   Assessment & Plan    Encouraged weight loss     Tobacco abuse- (present on admission)   Assessment & Plan    Greater than 10 minutes spent with patient smoking cessation counseling. Discussed cardiovascular risk factors of smoking. Nicotine patch provided.          VTE prophylaxis: scd

## 2019-01-17 NOTE — ASSESSMENT & PLAN NOTE
Gross hematuria started today   Not on anticoagulation   Recheck hgb, hematocrit in am   Monitor for resolution of symptoms, consider urology consult in am   Check CT urogram

## 2019-01-17 NOTE — ASSESSMENT & PLAN NOTE
Greater than 10 minutes spent with patient smoking cessation counseling. Discussed cardiovascular risk factors of smoking. Nicotine patch provided.

## 2019-01-17 NOTE — ED NOTES
Telephone report to Mary FUENTES. [facility] by [facility].

## 2019-01-17 NOTE — ED NOTES
Reassessed Pt V/S due to PT complaining on increasing SOB, Advised triage nurse of Pt change in condition.

## 2019-01-17 NOTE — DISCHARGE INSTRUCTIONS
Discharge Instructions    Discharged to home by taxi with self. Discharged via wheelchair, hospital escort: Yes.  Special equipment needed: Not Applicable    Be sure to schedule a follow-up appointment with your primary care doctor or any specialists as instructed.     Discharge Plan:   Diet Plan: Discussed  Activity Level: Discussed  Smoking Cessation Offered: Patient Refused (counseled on previous admission )  Confirmed Follow up Appointment: Appointment Scheduled  Confirmed Symptoms Management: Discussed  Medication Reconciliation Updated: Yes  Influenza Vaccine Indication: Not indicated: Previously immunized this influenza season and > 8 years of age    I understand that a diet low in cholesterol, fat, and sodium is recommended for good health. Unless I have been given specific instructions below for another diet, I accept this instruction as my diet prescription.   Other diet: Diabetic    Special Instructions: None    · Is patient discharged on Warfarin / Coumadin?   No     Depression / Suicide Risk    As you are discharged from this [facility], it is important to learn how to keep safe from harming yourself.    Recognize the warning signs:  · Abrupt changes in personality, positive or negative- including increase in energy   · Giving away possessions  · Change in eating patterns- significant weight changes-  positive or negative  · Change in sleeping patterns- unable to sleep or sleeping all the time   · Unwillingness or inability to communicate  · Depression  · Unusual sadness, discouragement and loneliness  · Talk of wanting to die  · Neglect of personal appearance   · Rebelliousness- reckless behavior  · Withdrawal from people/activities they love  · Confusion- inability to concentrate     If you or a loved one observes any of these behaviors or has concerns about self-harm, here's what you can do:  · Talk about it- your feelings and reasons for harming yourself  · Remove any means that you  might use to hurt yourself (examples: pills, rope, extension cords, firearm)  · Get professional help from the community (Mental Health, Substance Abuse, psychological counseling)  · Do not be alone:Call your Safe Contact- someone whom you trust who will be there for you.  · Call your local CRISIS HOTLINE 500-2693 or 284-080-4689  · Call your local Children's Mobile Crisis Response Team [place] (677) 900-4949 or www.[employer]  · Call the toll free National Suicide Prevention Hotlines   · National Suicide Prevention Lifeline 214-944-GCRV (8751)  · National Hope Line Network 800-SUICIDE (155-4475)

## 2019-01-17 NOTE — CARE PLAN
Problem: Bowel/Gastric:  Goal: Normal bowel function is maintained or improved  Outcome: PROGRESSING AS EXPECTED      Problem: Pain Management  Goal: Pain level will decrease to patient's comfort goal  Outcome: PROGRESSING AS EXPECTED  Pt reports pain manageable at this time

## 2019-01-17 NOTE — PROGRESS NOTES
Pt bm with bright red blood noted in toilet. Pt denies hemorrhoids, denies abdominal tenderness, denies dizziness, or light headedness. hgb 9.0  Notified MD Zapata.

## 2019-01-17 NOTE — PROGRESS NOTES
1623  Pt discharged via walking (steady), escorted by RN.  Left with all personal possessions.  Left in taxi.  No signs of distress noted. All lines removed. Given discharge instructions. Verbalized understanding.

## 2020-02-14 NOTE — OR SURGEON
Immediate Post OP Note    PreOp Diagnosis: GI bleed, dysphagia  PostOp Diagnosis: Severe reflux esophagitis    Procedure(s):  GASTROSCOPY-ENDO - Wound Class: Clean Contaminated    Surgeon(s):  Toro Reis M.D.    Anesthesiologist/Type of Anesthesia:  Anesthesiologist: Yohana Mccall M.D./General    Surgical Staff:  Circulator: Winnie Oliveira R.N.  Endoscopy Technician: Margaux Lr    Specimens removed if any:  ID Type Source Tests Collected by Time Destination   A :  Tissue Esophagus PATHOLOGY SPECIMEN Toro Reis M.D. 1/14/2019  3:14 PM        Estimated Blood Loss: minimal    Findings: As above.  Small hiatal hernia.  O/w normal    Complications: none    Plan: reflux precautions.  Full liquids.  Transition PPI.        1/14/2019 3:19 PM Toro Reis M.D.     Doxycycline Counseling:  Patient counseled regarding possible photosensitivity and increased risk for sunburn.  Patient instructed to avoid sunlight, if possible.  When exposed to sunlight, patients should wear protective clothing, sunglasses, and sunscreen.  The patient was instructed to call the office immediately if the following severe adverse effects occur:  hearing changes, easy bruising/bleeding, severe headache, or vision changes.  The patient verbalized understanding of the proper use and possible adverse effects of doxycycline.  All of the patient's questions and concerns were addressed.

## 2020-09-20 ENCOUNTER — APPOINTMENT (OUTPATIENT)
Dept: RADIOLOGY | Facility: MEDICAL CENTER | Age: 50
End: 2020-09-20
Attending: EMERGENCY MEDICINE
Payer: COMMERCIAL

## 2020-09-20 ENCOUNTER — HOSPITAL ENCOUNTER (EMERGENCY)
Facility: MEDICAL CENTER | Age: 50
End: 2020-09-21
Attending: EMERGENCY MEDICINE
Payer: COMMERCIAL

## 2020-09-20 VITALS
HEIGHT: 66 IN | SYSTOLIC BLOOD PRESSURE: 179 MMHG | BODY MASS INDEX: 50.07 KG/M2 | DIASTOLIC BLOOD PRESSURE: 121 MMHG | OXYGEN SATURATION: 96 % | HEART RATE: 99 BPM | RESPIRATION RATE: 20 BRPM | TEMPERATURE: 98.6 F

## 2020-09-20 DIAGNOSIS — M25.511 ACUTE PAIN OF RIGHT SHOULDER: ICD-10-CM

## 2020-09-20 DIAGNOSIS — N17.9 AKI (ACUTE KIDNEY INJURY) (HCC): ICD-10-CM

## 2020-09-20 LAB
ANION GAP SERPL CALC-SCNC: 16 MMOL/L (ref 7–16)
BASOPHILS # BLD AUTO: 0.5 % (ref 0–1.8)
BASOPHILS # BLD: 0.06 K/UL (ref 0–0.12)
BUN SERPL-MCNC: 13 MG/DL (ref 8–22)
CALCIUM SERPL-MCNC: 8.9 MG/DL (ref 8.5–10.5)
CHLORIDE SERPL-SCNC: 101 MMOL/L (ref 96–112)
CK SERPL-CCNC: 955 U/L (ref 0–154)
CO2 SERPL-SCNC: 23 MMOL/L (ref 20–33)
CREAT SERPL-MCNC: 1.75 MG/DL (ref 0.5–1.4)
EOSINOPHIL # BLD AUTO: 0.05 K/UL (ref 0–0.51)
EOSINOPHIL NFR BLD: 0.4 % (ref 0–6.9)
ERYTHROCYTE [DISTWIDTH] IN BLOOD BY AUTOMATED COUNT: 49.7 FL (ref 35.9–50)
GLUCOSE SERPL-MCNC: 109 MG/DL (ref 65–99)
HCT VFR BLD AUTO: 44 % (ref 42–52)
HGB BLD-MCNC: 14.6 G/DL (ref 14–18)
IMM GRANULOCYTES # BLD AUTO: 0.08 K/UL (ref 0–0.11)
IMM GRANULOCYTES NFR BLD AUTO: 0.6 % (ref 0–0.9)
LYMPHOCYTES # BLD AUTO: 1.19 K/UL (ref 1–4.8)
LYMPHOCYTES NFR BLD: 9.1 % (ref 22–41)
MCH RBC QN AUTO: 30 PG (ref 27–33)
MCHC RBC AUTO-ENTMCNC: 33.2 G/DL (ref 33.7–35.3)
MCV RBC AUTO: 90.3 FL (ref 81.4–97.8)
MONOCYTES # BLD AUTO: 1.43 K/UL (ref 0–0.85)
MONOCYTES NFR BLD AUTO: 10.9 % (ref 0–13.4)
NEUTROPHILS # BLD AUTO: 10.29 K/UL (ref 1.82–7.42)
NEUTROPHILS NFR BLD: 78.5 % (ref 44–72)
NRBC # BLD AUTO: 0 K/UL
NRBC BLD-RTO: 0 /100 WBC
PLATELET # BLD AUTO: 304 K/UL (ref 164–446)
PMV BLD AUTO: 9.7 FL (ref 9–12.9)
POTASSIUM SERPL-SCNC: 3.5 MMOL/L (ref 3.6–5.5)
RBC # BLD AUTO: 4.87 M/UL (ref 4.7–6.1)
SODIUM SERPL-SCNC: 140 MMOL/L (ref 135–145)
WBC # BLD AUTO: 13.1 K/UL (ref 4.8–10.8)

## 2020-09-20 PROCEDURE — 82550 ASSAY OF CK (CPK): CPT

## 2020-09-20 PROCEDURE — 85025 COMPLETE CBC W/AUTO DIFF WBC: CPT

## 2020-09-20 PROCEDURE — 700105 HCHG RX REV CODE 258: Performed by: EMERGENCY MEDICINE

## 2020-09-20 PROCEDURE — 80048 BASIC METABOLIC PNL TOTAL CA: CPT

## 2020-09-20 PROCEDURE — 99284 EMERGENCY DEPT VISIT MOD MDM: CPT

## 2020-09-20 PROCEDURE — 73030 X-RAY EXAM OF SHOULDER: CPT | Mod: RT

## 2020-09-20 RX ORDER — SODIUM CHLORIDE 9 MG/ML
1000 INJECTION, SOLUTION INTRAVENOUS ONCE
Status: COMPLETED | OUTPATIENT
Start: 2020-09-20 | End: 2020-09-20

## 2020-09-20 RX ADMIN — SODIUM CHLORIDE 1000 ML: 9 INJECTION, SOLUTION INTRAVENOUS at 21:40
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
## 2020-09-21 NOTE — ED TRIAGE NOTES
Chief Complaint   Patient presents with   • Shoulder Pain     Pt BIB EMS for bilateral shoulder pain. Pt [place] and [place].   • T-5000 FALL

## 2020-09-21 NOTE — ED NOTES
"Pt discharged home. IV discontinued and gauze placed, pt in possession of belongings. Pt provided discharge education and information pertaining to medications and follow up appointments. Pt received copy of discharge instructions and verbalized understanding. BP (!) 179/121   Pulse 99   Temp 37 °C (98.6 °F) (Temporal)   Resp 20   Ht 1.676 m (5' 6\")   SpO2 96%   BMI 50.07 kg/m²     "

## 2020-12-07 ENCOUNTER — APPOINTMENT (OUTPATIENT)
Dept: RADIOLOGY | Facility: MEDICAL CENTER | Age: 50
DRG: 603 | End: 2020-12-07
Attending: EMERGENCY MEDICINE
Payer: MEDICAID

## 2020-12-07 ENCOUNTER — HOSPITAL ENCOUNTER (INPATIENT)
Facility: MEDICAL CENTER | Age: 50
LOS: 1 days | DRG: 603 | End: 2020-12-09
Attending: EMERGENCY MEDICINE | Admitting: INTERNAL MEDICINE
Payer: MEDICAID

## 2020-12-07 DIAGNOSIS — L03.116 BILATERAL LOWER LEG CELLULITIS: ICD-10-CM

## 2020-12-07 DIAGNOSIS — L03.115 BILATERAL LOWER LEG CELLULITIS: ICD-10-CM

## 2020-12-07 DIAGNOSIS — E66.01 MORBID OBESITY (HCC): ICD-10-CM

## 2020-12-07 PROBLEM — F17.200 TOBACCO DEPENDENCE: Status: ACTIVE | Noted: 2020-12-07

## 2020-12-07 PROBLEM — Z86.711 HISTORY OF PULMONARY EMBOLISM: Status: ACTIVE | Noted: 2020-12-07

## 2020-12-07 PROBLEM — L03.119 LOWER EXTREMITY CELLULITIS: Status: ACTIVE | Noted: 2020-12-07

## 2020-12-07 PROBLEM — Z79.01 CHRONIC ANTICOAGULATION: Status: ACTIVE | Noted: 2020-12-07

## 2020-12-07 PROBLEM — G47.33 OSA (OBSTRUCTIVE SLEEP APNEA): Status: ACTIVE | Noted: 2020-12-07

## 2020-12-07 LAB
ALBUMIN SERPL BCP-MCNC: 4 G/DL (ref 3.2–4.9)
ALBUMIN/GLOB SERPL: 1 G/DL
ALP SERPL-CCNC: 92 U/L (ref 30–99)
ALT SERPL-CCNC: 55 U/L (ref 2–50)
AMPHET UR QL SCN: POSITIVE
ANION GAP SERPL CALC-SCNC: 12 MMOL/L (ref 7–16)
APPEARANCE UR: CLEAR
AST SERPL-CCNC: 33 U/L (ref 12–45)
BARBITURATES UR QL SCN: NEGATIVE
BASOPHILS # BLD AUTO: 0.7 % (ref 0–1.8)
BASOPHILS # BLD: 0.05 K/UL (ref 0–0.12)
BENZODIAZ UR QL SCN: NEGATIVE
BILIRUB SERPL-MCNC: 0.8 MG/DL (ref 0.1–1.5)
BILIRUB UR QL STRIP.AUTO: NEGATIVE
BUN SERPL-MCNC: 10 MG/DL (ref 8–22)
BZE UR QL SCN: NEGATIVE
CALCIUM SERPL-MCNC: 9.1 MG/DL (ref 8.4–10.2)
CANNABINOIDS UR QL SCN: NEGATIVE
CHLORIDE SERPL-SCNC: 97 MMOL/L (ref 96–112)
CO2 SERPL-SCNC: 29 MMOL/L (ref 20–33)
COLOR UR: YELLOW
COVID ORDER STATUS COVID19: NORMAL
CREAT SERPL-MCNC: 0.79 MG/DL (ref 0.5–1.4)
EOSINOPHIL # BLD AUTO: 0.43 K/UL (ref 0–0.51)
EOSINOPHIL NFR BLD: 6.2 % (ref 0–6.9)
ERYTHROCYTE [DISTWIDTH] IN BLOOD BY AUTOMATED COUNT: 46.7 FL (ref 35.9–50)
GLOBULIN SER CALC-MCNC: 4 G/DL (ref 1.9–3.5)
GLUCOSE SERPL-MCNC: 106 MG/DL (ref 65–99)
GLUCOSE UR STRIP.AUTO-MCNC: NEGATIVE MG/DL
HCT VFR BLD AUTO: 42.3 % (ref 42–52)
HGB BLD-MCNC: 13.9 G/DL (ref 14–18)
IMM GRANULOCYTES # BLD AUTO: 0.05 K/UL (ref 0–0.11)
IMM GRANULOCYTES NFR BLD AUTO: 0.7 % (ref 0–0.9)
INR PPP: 1.05 (ref 0.87–1.13)
KETONES UR STRIP.AUTO-MCNC: NEGATIVE MG/DL
LACTATE BLD-SCNC: 1.9 MMOL/L (ref 0.5–2)
LACTATE BLD-SCNC: 2.2 MMOL/L (ref 0.5–2)
LACTATE BLD-SCNC: 2.4 MMOL/L (ref 0.5–2)
LEUKOCYTE ESTERASE UR QL STRIP.AUTO: NEGATIVE
LYMPHOCYTES # BLD AUTO: 1.41 K/UL (ref 1–4.8)
LYMPHOCYTES NFR BLD: 20.4 % (ref 22–41)
MCH RBC QN AUTO: 29.1 PG (ref 27–33)
MCHC RBC AUTO-ENTMCNC: 32.9 G/DL (ref 33.7–35.3)
MCV RBC AUTO: 88.7 FL (ref 81.4–97.8)
METHADONE UR QL SCN: NEGATIVE
MICRO URNS: NORMAL
MONOCYTES # BLD AUTO: 0.72 K/UL (ref 0–0.85)
MONOCYTES NFR BLD AUTO: 10.4 % (ref 0–13.4)
NEUTROPHILS # BLD AUTO: 4.26 K/UL (ref 1.82–7.42)
NEUTROPHILS NFR BLD: 61.6 % (ref 44–72)
NITRITE UR QL STRIP.AUTO: NEGATIVE
NRBC # BLD AUTO: 0 K/UL
NRBC BLD-RTO: 0 /100 WBC
OPIATES UR QL SCN: NEGATIVE
OXYCODONE UR QL SCN: NEGATIVE
PCP UR QL SCN: NEGATIVE
PH UR STRIP.AUTO: 6 [PH] (ref 5–8)
PLATELET # BLD AUTO: 259 K/UL (ref 164–446)
PMV BLD AUTO: 9.8 FL (ref 9–12.9)
POTASSIUM SERPL-SCNC: 3.3 MMOL/L (ref 3.6–5.5)
PROPOXYPH UR QL SCN: NEGATIVE
PROT SERPL-MCNC: 8 G/DL (ref 6–8.2)
PROT UR QL STRIP: NEGATIVE MG/DL
PROTHROMBIN TIME: 13.4 SEC (ref 12–14.6)
RBC # BLD AUTO: 4.77 M/UL (ref 4.7–6.1)
RBC UR QL AUTO: NEGATIVE
SODIUM SERPL-SCNC: 138 MMOL/L (ref 135–145)
SP GR UR STRIP.AUTO: 1.01
WBC # BLD AUTO: 6.9 K/UL (ref 4.8–10.8)

## 2020-12-07 PROCEDURE — 96365 THER/PROPH/DIAG IV INF INIT: CPT

## 2020-12-07 PROCEDURE — 80053 COMPREHEN METABOLIC PANEL: CPT

## 2020-12-07 PROCEDURE — 80307 DRUG TEST PRSMV CHEM ANLYZR: CPT

## 2020-12-07 PROCEDURE — 700111 HCHG RX REV CODE 636 W/ 250 OVERRIDE (IP): Performed by: EMERGENCY MEDICINE

## 2020-12-07 PROCEDURE — 700111 HCHG RX REV CODE 636 W/ 250 OVERRIDE (IP): Performed by: INTERNAL MEDICINE

## 2020-12-07 PROCEDURE — 72131 CT LUMBAR SPINE W/O DYE: CPT

## 2020-12-07 PROCEDURE — 85610 PROTHROMBIN TIME: CPT

## 2020-12-07 PROCEDURE — G0378 HOSPITAL OBSERVATION PER HR: HCPCS

## 2020-12-07 PROCEDURE — 99219 PR INITIAL OBSERVATION CARE,LEVL II: CPT | Mod: 25 | Performed by: INTERNAL MEDICINE

## 2020-12-07 PROCEDURE — 81003 URINALYSIS AUTO W/O SCOPE: CPT

## 2020-12-07 PROCEDURE — 700105 HCHG RX REV CODE 258: Performed by: INTERNAL MEDICINE

## 2020-12-07 PROCEDURE — 87040 BLOOD CULTURE FOR BACTERIA: CPT

## 2020-12-07 PROCEDURE — C9803 HOPD COVID-19 SPEC COLLECT: HCPCS | Performed by: EMERGENCY MEDICINE

## 2020-12-07 PROCEDURE — 99285 EMERGENCY DEPT VISIT HI MDM: CPT

## 2020-12-07 PROCEDURE — 85025 COMPLETE CBC W/AUTO DIFF WBC: CPT

## 2020-12-07 PROCEDURE — 96375 TX/PRO/DX INJ NEW DRUG ADDON: CPT

## 2020-12-07 PROCEDURE — 36415 COLL VENOUS BLD VENIPUNCTURE: CPT

## 2020-12-07 PROCEDURE — 700105 HCHG RX REV CODE 258: Performed by: EMERGENCY MEDICINE

## 2020-12-07 PROCEDURE — 83605 ASSAY OF LACTIC ACID: CPT

## 2020-12-07 PROCEDURE — 99406 BEHAV CHNG SMOKING 3-10 MIN: CPT | Performed by: INTERNAL MEDICINE

## 2020-12-07 PROCEDURE — U0003 INFECTIOUS AGENT DETECTION BY NUCLEIC ACID (DNA OR RNA); SEVERE ACUTE RESPIRATORY SYNDROME CORONAVIRUS 2 (SARS-COV-2) (CORONAVIRUS DISEASE [COVID-19]), AMPLIFIED PROBE TECHNIQUE, MAKING USE OF HIGH THROUGHPUT TECHNOLOGIES AS DESCRIBED BY CMS-2020-01-R: HCPCS

## 2020-12-07 PROCEDURE — 96367 TX/PROPH/DG ADDL SEQ IV INF: CPT

## 2020-12-07 RX ORDER — AMOXICILLIN 250 MG
2 CAPSULE ORAL 2 TIMES DAILY
Status: DISCONTINUED | OUTPATIENT
Start: 2020-12-07 | End: 2020-12-09 | Stop reason: HOSPADM

## 2020-12-07 RX ORDER — MORPHINE SULFATE 4 MG/ML
4 INJECTION, SOLUTION INTRAMUSCULAR; INTRAVENOUS ONCE
Status: COMPLETED | OUTPATIENT
Start: 2020-12-07 | End: 2020-12-07

## 2020-12-07 RX ORDER — ACETAMINOPHEN 500 MG
1000 TABLET ORAL EVERY 6 HOURS PRN
COMMUNITY

## 2020-12-07 RX ORDER — LACTOBACILLUS RHAMNOSUS GG 10B CELL
1 CAPSULE ORAL
Status: DISCONTINUED | OUTPATIENT
Start: 2020-12-08 | End: 2020-12-08

## 2020-12-07 RX ORDER — HYDROMORPHONE HYDROCHLORIDE 1 MG/ML
0.25 INJECTION, SOLUTION INTRAMUSCULAR; INTRAVENOUS; SUBCUTANEOUS
Status: DISCONTINUED | OUTPATIENT
Start: 2020-12-07 | End: 2020-12-08

## 2020-12-07 RX ORDER — PROMETHAZINE HYDROCHLORIDE 25 MG/1
12.5-25 TABLET ORAL EVERY 4 HOURS PRN
Status: DISCONTINUED | OUTPATIENT
Start: 2020-12-07 | End: 2020-12-08

## 2020-12-07 RX ORDER — ONDANSETRON 2 MG/ML
4 INJECTION INTRAMUSCULAR; INTRAVENOUS EVERY 4 HOURS PRN
Status: DISCONTINUED | OUTPATIENT
Start: 2020-12-07 | End: 2020-12-08

## 2020-12-07 RX ORDER — FUROSEMIDE 20 MG/1
20 TABLET ORAL EVERY MORNING
Status: DISCONTINUED | OUTPATIENT
Start: 2020-12-08 | End: 2020-12-09 | Stop reason: HOSPADM

## 2020-12-07 RX ORDER — PROCHLORPERAZINE EDISYLATE 5 MG/ML
5-10 INJECTION INTRAMUSCULAR; INTRAVENOUS EVERY 4 HOURS PRN
Status: DISCONTINUED | OUTPATIENT
Start: 2020-12-07 | End: 2020-12-08

## 2020-12-07 RX ORDER — ACETAMINOPHEN 325 MG/1
650 TABLET ORAL EVERY 6 HOURS PRN
Status: DISCONTINUED | OUTPATIENT
Start: 2020-12-07 | End: 2020-12-09 | Stop reason: HOSPADM

## 2020-12-07 RX ORDER — FUROSEMIDE 20 MG/1
20 TABLET ORAL EVERY MORNING
COMMUNITY

## 2020-12-07 RX ORDER — POLYETHYLENE GLYCOL 3350 17 G/17G
1 POWDER, FOR SOLUTION ORAL
Status: DISCONTINUED | OUTPATIENT
Start: 2020-12-07 | End: 2020-12-09 | Stop reason: HOSPADM

## 2020-12-07 RX ORDER — OXYCODONE HYDROCHLORIDE 5 MG/1
5 TABLET ORAL
Status: DISCONTINUED | OUTPATIENT
Start: 2020-12-07 | End: 2020-12-08

## 2020-12-07 RX ORDER — OMEPRAZOLE 20 MG/1
40 CAPSULE, DELAYED RELEASE ORAL DAILY
Status: DISCONTINUED | OUTPATIENT
Start: 2020-12-08 | End: 2020-12-09 | Stop reason: HOSPADM

## 2020-12-07 RX ORDER — SODIUM CHLORIDE, SODIUM LACTATE, POTASSIUM CHLORIDE, AND CALCIUM CHLORIDE .6; .31; .03; .02 G/100ML; G/100ML; G/100ML; G/100ML
500 INJECTION, SOLUTION INTRAVENOUS
Status: COMPLETED | OUTPATIENT
Start: 2020-12-07 | End: 2020-12-07

## 2020-12-07 RX ORDER — ONDANSETRON 4 MG/1
4 TABLET, ORALLY DISINTEGRATING ORAL EVERY 4 HOURS PRN
Status: DISCONTINUED | OUTPATIENT
Start: 2020-12-07 | End: 2020-12-08

## 2020-12-07 RX ORDER — BISACODYL 10 MG
10 SUPPOSITORY, RECTAL RECTAL
Status: DISCONTINUED | OUTPATIENT
Start: 2020-12-07 | End: 2020-12-09 | Stop reason: HOSPADM

## 2020-12-07 RX ORDER — PROMETHAZINE HYDROCHLORIDE 25 MG/1
12.5-25 SUPPOSITORY RECTAL EVERY 4 HOURS PRN
Status: DISCONTINUED | OUTPATIENT
Start: 2020-12-07 | End: 2020-12-08

## 2020-12-07 RX ORDER — METOPROLOL SUCCINATE 25 MG/1
25 TABLET, EXTENDED RELEASE ORAL EVERY MORNING
Status: DISCONTINUED | OUTPATIENT
Start: 2020-12-08 | End: 2020-12-09 | Stop reason: HOSPADM

## 2020-12-07 RX ORDER — OXYCODONE HYDROCHLORIDE 5 MG/1
2.5 TABLET ORAL
Status: DISCONTINUED | OUTPATIENT
Start: 2020-12-07 | End: 2020-12-08

## 2020-12-07 RX ADMIN — MORPHINE SULFATE 4 MG: 4 INJECTION INTRAVENOUS at 15:41

## 2020-12-07 RX ADMIN — SODIUM CHLORIDE 3 G: 900 INJECTION INTRAVENOUS at 15:41

## 2020-12-07 RX ADMIN — SODIUM CHLORIDE 3 G: 900 INJECTION INTRAVENOUS at 23:46

## 2020-12-07 RX ADMIN — SODIUM CHLORIDE, POTASSIUM CHLORIDE, SODIUM LACTATE AND CALCIUM CHLORIDE 500 ML: 600; 310; 30; 20 INJECTION, SOLUTION INTRAVENOUS at 20:23

## 2020-12-07 ASSESSMENT — ENCOUNTER SYMPTOMS
TREMORS: 0
DIARRHEA: 0
BLOOD IN STOOL: 0
INSOMNIA: 0
EYE PAIN: 0
PALPITATIONS: 0
EYE REDNESS: 0
VOMITING: 0
HEADACHES: 0
SEIZURES: 0
SHORTNESS OF BREATH: 0
LOSS OF CONSCIOUSNESS: 0
WEAKNESS: 0
CONSTIPATION: 0
HEMOPTYSIS: 0
CHILLS: 0
MYALGIAS: 0
ABDOMINAL PAIN: 0
FEVER: 0
FOCAL WEAKNESS: 0
DIZZINESS: 0
NERVOUS/ANXIOUS: 0
FALLS: 0
WHEEZING: 0
COUGH: 0
NAUSEA: 0

## 2020-12-07 ASSESSMENT — PAIN DESCRIPTION - PAIN TYPE
TYPE: ACUTE PAIN
TYPE: ACUTE PAIN

## 2020-12-07 ASSESSMENT — FIBROSIS 4 INDEX: FIB4 SCORE: 0.33
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
## 2020-12-08 ENCOUNTER — APPOINTMENT (OUTPATIENT)
Dept: CARDIOLOGY | Facility: MEDICAL CENTER | Age: 50
DRG: 603 | End: 2020-12-08
Attending: STUDENT IN AN ORGANIZED HEALTH CARE EDUCATION/TRAINING PROGRAM
Payer: MEDICAID

## 2020-12-08 ENCOUNTER — APPOINTMENT (OUTPATIENT)
Dept: RADIOLOGY | Facility: MEDICAL CENTER | Age: 50
DRG: 603 | End: 2020-12-08
Attending: STUDENT IN AN ORGANIZED HEALTH CARE EDUCATION/TRAINING PROGRAM
Payer: MEDICAID

## 2020-12-08 LAB
ANION GAP SERPL CALC-SCNC: 12 MMOL/L (ref 7–16)
APPEARANCE UR: CLEAR
BILIRUB UR QL STRIP.AUTO: NEGATIVE
BUN SERPL-MCNC: 10 MG/DL (ref 8–22)
CALCIUM SERPL-MCNC: 8.7 MG/DL (ref 8.4–10.2)
CHLORIDE SERPL-SCNC: 99 MMOL/L (ref 96–112)
CO2 SERPL-SCNC: 27 MMOL/L (ref 20–33)
COLOR UR: YELLOW
CREAT SERPL-MCNC: 0.86 MG/DL (ref 0.5–1.4)
ERYTHROCYTE [DISTWIDTH] IN BLOOD BY AUTOMATED COUNT: 48 FL (ref 35.9–50)
GLUCOSE SERPL-MCNC: 131 MG/DL (ref 65–99)
GLUCOSE UR STRIP.AUTO-MCNC: NEGATIVE MG/DL
HCT VFR BLD AUTO: 39.4 % (ref 42–52)
HGB BLD-MCNC: 13 G/DL (ref 14–18)
KETONES UR STRIP.AUTO-MCNC: ABNORMAL MG/DL
LACTATE BLD-SCNC: 2 MMOL/L (ref 0.5–2)
LEUKOCYTE ESTERASE UR QL STRIP.AUTO: NEGATIVE
MCH RBC QN AUTO: 29.5 PG (ref 27–33)
MCHC RBC AUTO-ENTMCNC: 33 G/DL (ref 33.7–35.3)
MCV RBC AUTO: 89.5 FL (ref 81.4–97.8)
MICRO URNS: ABNORMAL
NITRITE UR QL STRIP.AUTO: NEGATIVE
NT-PROBNP SERPL IA-MCNC: 155 PG/ML (ref 0–125)
PH UR STRIP.AUTO: 7.5 [PH] (ref 5–8)
PLATELET # BLD AUTO: 248 K/UL (ref 164–446)
PMV BLD AUTO: 9.8 FL (ref 9–12.9)
POTASSIUM SERPL-SCNC: 3.4 MMOL/L (ref 3.6–5.5)
PROT UR QL STRIP: NEGATIVE MG/DL
RBC # BLD AUTO: 4.4 M/UL (ref 4.7–6.1)
RBC UR QL AUTO: NEGATIVE
SARS-COV-2 RNA RESP QL NAA+PROBE: NOTDETECTED
SODIUM SERPL-SCNC: 138 MMOL/L (ref 135–145)
SP GR UR STRIP.AUTO: 1.01
SPECIMEN SOURCE: NORMAL
WBC # BLD AUTO: 7.2 K/UL (ref 4.8–10.8)

## 2020-12-08 PROCEDURE — 700105 HCHG RX REV CODE 258: Performed by: INTERNAL MEDICINE

## 2020-12-08 PROCEDURE — 700102 HCHG RX REV CODE 250 W/ 637 OVERRIDE(OP): Performed by: INTERNAL MEDICINE

## 2020-12-08 PROCEDURE — 99285 EMERGENCY DEPT VISIT HI MDM: CPT

## 2020-12-08 PROCEDURE — 36415 COLL VENOUS BLD VENIPUNCTURE: CPT

## 2020-12-08 PROCEDURE — 99233 SBSQ HOSP IP/OBS HIGH 50: CPT | Performed by: STUDENT IN AN ORGANIZED HEALTH CARE EDUCATION/TRAINING PROGRAM

## 2020-12-08 PROCEDURE — 80048 BASIC METABOLIC PNL TOTAL CA: CPT

## 2020-12-08 PROCEDURE — A9270 NON-COVERED ITEM OR SERVICE: HCPCS | Performed by: STUDENT IN AN ORGANIZED HEALTH CARE EDUCATION/TRAINING PROGRAM

## 2020-12-08 PROCEDURE — 81003 URINALYSIS AUTO W/O SCOPE: CPT

## 2020-12-08 PROCEDURE — 96375 TX/PRO/DX INJ NEW DRUG ADDON: CPT

## 2020-12-08 PROCEDURE — 770006 HCHG ROOM/CARE - MED/SURG/GYN SEMI*

## 2020-12-08 PROCEDURE — 700102 HCHG RX REV CODE 250 W/ 637 OVERRIDE(OP): Performed by: STUDENT IN AN ORGANIZED HEALTH CARE EDUCATION/TRAINING PROGRAM

## 2020-12-08 PROCEDURE — 83605 ASSAY OF LACTIC ACID: CPT

## 2020-12-08 PROCEDURE — 83880 ASSAY OF NATRIURETIC PEPTIDE: CPT

## 2020-12-08 PROCEDURE — 96365 THER/PROPH/DIAG IV INF INIT: CPT

## 2020-12-08 PROCEDURE — 700111 HCHG RX REV CODE 636 W/ 250 OVERRIDE (IP): Performed by: INTERNAL MEDICINE

## 2020-12-08 PROCEDURE — 93306 TTE W/DOPPLER COMPLETE: CPT

## 2020-12-08 PROCEDURE — 85027 COMPLETE CBC AUTOMATED: CPT

## 2020-12-08 PROCEDURE — 71045 X-RAY EXAM CHEST 1 VIEW: CPT

## 2020-12-08 PROCEDURE — 96366 THER/PROPH/DIAG IV INF ADDON: CPT

## 2020-12-08 PROCEDURE — A9270 NON-COVERED ITEM OR SERVICE: HCPCS | Performed by: INTERNAL MEDICINE

## 2020-12-08 RX ORDER — POTASSIUM CHLORIDE 20 MEQ/1
20 TABLET, EXTENDED RELEASE ORAL ONCE
Status: COMPLETED | OUTPATIENT
Start: 2020-12-08 | End: 2020-12-08

## 2020-12-08 RX ORDER — AMOXICILLIN AND CLAVULANATE POTASSIUM 875; 125 MG/1; MG/1
1 TABLET, FILM COATED ORAL EVERY 12 HOURS
Status: DISCONTINUED | OUTPATIENT
Start: 2020-12-08 | End: 2020-12-09 | Stop reason: HOSPADM

## 2020-12-08 RX ADMIN — AMOXICILLIN AND CLAVULANATE POTASSIUM 1 TABLET: 875; 125 TABLET, FILM COATED ORAL at 17:07

## 2020-12-08 RX ADMIN — POTASSIUM CHLORIDE 20 MEQ: 1500 TABLET, EXTENDED RELEASE ORAL at 15:57

## 2020-12-08 RX ADMIN — OMEPRAZOLE 40 MG: 20 CAPSULE, DELAYED RELEASE ORAL at 05:29

## 2020-12-08 RX ADMIN — Medication 1 CAPSULE: at 08:05

## 2020-12-08 RX ADMIN — METOPROLOL SUCCINATE 25 MG: 25 TABLET, EXTENDED RELEASE ORAL at 05:29

## 2020-12-08 RX ADMIN — SODIUM CHLORIDE 3 G: 900 INJECTION INTRAVENOUS at 05:30

## 2020-12-08 RX ADMIN — SENNOSIDES-DOCUSATE SODIUM TAB 8.6-50 MG 2 TABLET: 8.6-5 TAB at 17:07

## 2020-12-08 RX ADMIN — FUROSEMIDE 20 MG: 20 TABLET ORAL at 05:29

## 2020-12-08 RX ADMIN — RIVAROXABAN 20 MG: 20 TABLET, FILM COATED ORAL at 05:29

## 2020-12-08 RX ADMIN — SODIUM CHLORIDE 3 G: 900 INJECTION INTRAVENOUS at 11:07

## 2020-12-08 ASSESSMENT — ENCOUNTER SYMPTOMS
DIZZINESS: 0
CHILLS: 0
MYALGIAS: 0
NECK PAIN: 0
RESPIRATORY NEGATIVE: 1
GASTROINTESTINAL NEGATIVE: 1
WEAKNESS: 1
TINGLING: 1
CARDIOVASCULAR NEGATIVE: 1
FEVER: 0
EYES NEGATIVE: 1
DEPRESSION: 0
HEADACHES: 0

## 2020-12-08 NOTE — ASSESSMENT & PLAN NOTE
When better, exercise, lifestyle modification, weight loss. He says he sees Dr. Leal, considering bariatric intervention

## 2020-12-08 NOTE — PROGRESS NOTES
Hospital Medicine Daily Progress Note    Date of Service  12/8/2020    Chief Complaint  50 y.o. male admitted 12/7/2020 with lower extremity edema    Hospital Course  50 y.o. male with a past medical history of asthma/COPD on inhalers, NINO supposed on CPAP, currently smoke, HTN, pulmonary embolism on Xarelto, Meth use, chronic LE edema and obesity who presented 12/7/2020 with Low Back Pain and Wound Re-Check    Interval Problem Update  AAOX4, afebrile and in NAD  Admits to remote meth use  Clinical picture concerning for new onset R-HF  CXR w/o edema  Pending ECHO  Labs on AM    Consultants/Specialty  None    Code Status  Full Code    Disposition  Pending ECHO    Review of Systems  Review of Systems   Constitutional: Negative for chills and fever.   HENT: Negative.    Eyes: Negative.    Respiratory: Negative.    Cardiovascular: Negative.    Gastrointestinal: Negative.    Genitourinary: Negative.    Musculoskeletal: Negative for myalgias and neck pain.   Skin: Negative for itching and rash.   Neurological: Positive for tingling and weakness. Negative for dizziness and headaches.   Psychiatric/Behavioral: Negative for depression and suicidal ideas.        Physical Exam  Temp:  [36.7 °C (98 °F)-36.9 °C (98.4 °F)] 36.7 °C (98 °F)  Pulse:  [62-90] 90  Resp:  [16-21] 18  BP: (123-140)/(59-89) 134/84  SpO2:  [89 %-97 %] 91 %    Physical Exam  Constitutional:       Appearance: Normal appearance. He is obese. He is not ill-appearing.   HENT:      Head: Normocephalic and atraumatic.      Mouth/Throat:      Mouth: Mucous membranes are moist.   Eyes:      Extraocular Movements: Extraocular movements intact.      Pupils: Pupils are equal, round, and reactive to light.   Neck:      Musculoskeletal: Normal range of motion and neck supple.   Cardiovascular:      Rate and Rhythm: Normal rate and regular rhythm.      Heart sounds: No murmur.   Pulmonary:      Effort: Pulmonary effort is normal.      Breath sounds: Normal breath  sounds.   Abdominal:      General: Bowel sounds are normal. There is distension.      Palpations: Abdomen is soft.      Tenderness: There is no abdominal tenderness.   Musculoskeletal:         General: Swelling, tenderness and deformity present.      Right lower leg: Edema present.      Left lower leg: Edema present.   Skin:     General: Skin is warm.      Findings: Bruising, erythema and rash present.   Neurological:      General: No focal deficit present.      Mental Status: He is alert and oriented to person, place, and time. Mental status is at baseline.      Cranial Nerves: No cranial nerve deficit.   Psychiatric:         Mood and Affect: Mood normal.         Behavior: Behavior normal.         Thought Content: Thought content normal.         Judgment: Judgment normal.         Fluids    Intake/Output Summary (Last 24 hours) at 12/8/2020 0811  Last data filed at 12/8/2020 0530  Gross per 24 hour   Intake 340 ml   Output 975 ml   Net -635 ml       Laboratory  Recent Labs     12/07/20  1455 12/08/20  0213   WBC 6.9 7.2   RBC 4.77 4.40*   HEMOGLOBIN 13.9* 13.0*   HEMATOCRIT 42.3 39.4*   MCV 88.7 89.5   MCH 29.1 29.5   MCHC 32.9* 33.0*   RDW 46.7 48.0   PLATELETCT 259 248   MPV 9.8 9.8     Recent Labs     12/07/20  1455 12/08/20  0213   SODIUM 138 138   POTASSIUM 3.3* 3.4*   CHLORIDE 97 99   CO2 29 27   GLUCOSE 106* 131*   BUN 10 10   CREATININE 0.79 0.86   CALCIUM 9.1 8.7     Recent Labs     12/07/20  1455   INR 1.05               Imaging  DX-CHEST-PORTABLE (1 VIEW)   Final Result      No acute cardiopulmonary abnormality identified.      CT-LSPINE W/O PLUS RECONS   Final Result      1.  No acute fracture is identified.      2.  Multilevel degenerative disc disease.      3.  Degenerative retrolisthesis at L2-3 and L3-4.      4.  Facet arthropathy in the lower lumbar spine.         EC-ECHOCARDIOGRAM COMPLETE W/O CONT    (Results Pending)        Assessment/Plan  * Lower extremity cellulitis  Assessment &  Plan  Switched to PO Augmentin  Pending ECHO    NINO (obstructive sleep apnea)  Assessment & Plan  Supposed to be on CPAP but noncompliant  Ordered nocturnal oximetry.    Chronic anticoagulation  Assessment & Plan  Continue    History of pulmonary embolism  Assessment & Plan  On Xarelto. Continue    Tobacco dependence  Assessment & Plan  I spent 3 minutes, discussing tobacco dependence and cardiac as well as pulmonary risk. Smoking cessation instructions. Code 27184      Morbid obesity (HCC)  Assessment & Plan  When better, exercise, lifestyle modification, weight loss. He says he sees Dr. Leal, considering bariatric intervention       VTE prophylaxis: Xarelto

## 2020-12-08 NOTE — H&P
Hospital Medicine History & Physical Note    Date of Service  12/7/2020    Primary Care Physician  No primary care provider on file.    Consultants      Code Status  Prior    Chief Complaint  Chief Complaint   Patient presents with   • Low Back Pain   • Wound Re-Check       History of Presenting Illness  50 y.o. male who presented 12/7/2020 with Low Back Pain and Wound Re-Check  Remote history of leg straightening surgery in 1988 c/b intermittent infections/cellulitis   Chronic LE edema and obesity.  History of HTN  History of asthma/COPD on inhalers, NINO supposed on CPAP, currently smoke  History of pulmonary embolism on Xarelto.  History of drug use before.  For the past few days, he has been complaining of increasing redness, swelling and pain of his lower extremities to the point today he could not walk. 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  Denied fevers or chills.  Denies headaches, fever, chest pain, shortness of breath, myalgias, GI symptoms. Denies exposure to CoVID whether it is from a known sick contact or travel to a highly endemic area or high risk places of [place].  At the ED, he is aferbrile, normotensive.  XR:  1.  No acute fracture is identified.  2.  Multilevel degenerative disc disease.  3.  Degenerative retrolisthesis at L2-3 and L3-4.  4.  Facet arthropathy in the lower lumbar spine.  No leukocytosis.   When I saw him at ED, he is fatigued. He is obese. Erythema and swelling of LE with tenderness.      Review of Systems  Review of Systems   Constitutional: Negative for chills and fever.   HENT: Negative for congestion, hearing loss and nosebleeds.    Eyes: Negative for pain and redness.   Respiratory: Negative for cough, hemoptysis, shortness of breath and wheezing.    Cardiovascular: Positive for leg swelling. Negative for chest pain and palpitations.   Gastrointestinal: Negative for  abdominal pain, blood in stool, constipation, diarrhea, nausea and vomiting.   Genitourinary: Negative for dysuria, frequency and hematuria.   Musculoskeletal: Negative for falls, joint pain and myalgias.   Skin: Positive for rash.   Neurological: Negative for dizziness, tremors, focal weakness, seizures, loss of consciousness, weakness and headaches.   Psychiatric/Behavioral: The patient is not nervous/anxious and does not have insomnia.    All other systems reviewed and are negative.      Past Medical History   has a past medical history of AVNRT (AV rashmi re-entry tachycardia) ([facility]) (1/16/2019).    Surgical History   has a past surgical history that includes gastroscopy-endo (N/A, 1/14/2019).     Family History  family history is not on file.   History of diabetes  Social History   reports that he has been smoking. He has been smoking about 1.00 pack per day. He has never used smokeless tobacco. He reports current alcohol use. He reports that he does not use drugs.    Allergies  No Known Allergies    Medications  Prior to Admission Medications   Prescriptions Last Dose Informant Patient Reported? Taking?   acetaminophen (TYLENOL) 500 MG Tab 12/6/2020 at AM Patient Yes Yes   Sig: Take 1,000 mg by mouth every 6 hours as needed. Indications: Pain   furosemide (LASIX) 20 MG Tab 12/7/2020 at AM Patient Yes Yes   Sig: Take 20 mg by mouth every morning.   metoprolol SR (TOPROL XL) 25 MG TABLET SR 24 HR 12/7/2020 at AM Patient No No   Sig: Take 1 Tab by mouth every day.   Patient taking differently: Take 25 mg by mouth every morning.   omeprazole (PRILOSEC) 40 MG delayed-release capsule 12/7/2020 at AM Patient No No   Sig: Take 1 Cap by mouth 2 times a day. For 7 days, qday thereafter.   Patient taking differently: Take 40 mg by mouth every day. ONCE DAILY   rivaroxaban (XARELTO) 20 MG Tab tablet 12/7/2020 at AM Patient Yes Yes   Sig: Take 20 mg by mouth every morning.      Facility-Administered Medications: None        Physical Exam  Temp:  [36.7 °C (98 °F)] 36.7 °C (98 °F)  Pulse:  [62-83] 72  Resp:  [16-21] 21  BP: (129-139)/(66-89) 129/66  SpO2:  [89 %-96 %] 89 %    Physical Exam  Vitals signs and nursing note reviewed.   Constitutional:       Appearance: He is obese.   HENT:      Head: Normocephalic and atraumatic.      Right Ear: External ear normal.      Left Ear: External ear normal.      Nose: Nose normal.      Mouth/Throat:      Mouth: Mucous membranes are moist.   Eyes:      General: No scleral icterus.     Conjunctiva/sclera: Conjunctivae normal.   Neck:      Musculoskeletal: Normal range of motion and neck supple.   Cardiovascular:      Rate and Rhythm: Normal rate and regular rhythm.      Heart sounds: No murmur. No friction rub. No gallop.    Pulmonary:      Effort: Pulmonary effort is normal.      Breath sounds: Normal breath sounds.   Abdominal:      General: Abdomen is flat. Bowel sounds are normal. There is no distension.      Palpations: Abdomen is soft.      Tenderness: There is no abdominal tenderness. There is no guarding.   Musculoskeletal: Normal range of motion.         General: Swelling and tenderness present.      Comments: Bilateral lower extremities   Skin:     General: Skin is warm.      Findings: Erythema, lesion and rash present.      Comments: Bilateral lower extremities   Neurological:      Mental Status: He is alert and oriented to person, place, and time. Mental status is at baseline.      Motor: Weakness present.   Psychiatric:         Mood and Affect: Mood normal.         Behavior: Behavior normal.         Thought Content: Thought content normal.         Judgment: Judgment normal.         Laboratory:  Recent Labs     12/07/20  1455   WBC 6.9   RBC 4.77   HEMOGLOBIN 13.9*   HEMATOCRIT 42.3   MCV 88.7   MCH 29.1   MCHC 32.9*   RDW 46.7   PLATELETCT 259   MPV 9.8     Recent Labs     12/07/20  1455   SODIUM 138   POTASSIUM 3.3*   CHLORIDE 97   CO2 29   GLUCOSE 106*   BUN 10   CREATININE 0.79    CALCIUM 9.1     Recent Labs     12/07/20  1455   ALTSGPT 55*   ASTSGOT 33   ALKPHOSPHAT 92   TBILIRUBIN 0.8   GLUCOSE 106*         No results for input(s): NTPROBNP in the last 72 hours.      No results for input(s): TROPONINT in the last 72 hours.    Imaging:  CT-LSPINE W/O PLUS RECONS   Final Result      1.  No acute fracture is identified.      2.  Multilevel degenerative disc disease.      3.  Degenerative retrolisthesis at L2-3 and L3-4.      4.  Facet arthropathy in the lower lumbar spine.               Assessment/Plan:  I anticipate this patient is appropriate for observation status at this time.    * Lower extremity cellulitis  Assessment & Plan  Antibiotics ordered  Pain control    NINO (obstructive sleep apnea)  Assessment & Plan  Supposed to be on CPAP but noncompliant  Ordered nocturnal oximetry.    Chronic anticoagulation  Assessment & Plan  Continue    History of pulmonary embolism  Assessment & Plan  On Xarelto. Continue    Tobacco dependence  Assessment & Plan  I spent 3 minutes, discussing tobacco dependence and cardiac as well as pulmonary risk. Smoking cessation instructions. Code 79422      Morbid obesity (HCC)  Assessment & Plan  When better, exercise, lifestyle modification, weight loss. He says he sees Dr. Leal, considering bariatric intervention    Xarelto

## 2020-12-08 NOTE — RESPIRATORY CARE
Room air oximetry check showed SAO2 of 87% for greater than 5 min. On room air.  Recommend formal sleep study.

## 2020-12-08 NOTE — DIETARY
NUTRITION SERVICES: BMI - Pt with BMI >40 (=Body mass index is 60.16 kg/m².), morbid obesity. Pt does have 4+ edema BLE per flow sheets. Weight loss counseling not appropriate in acute care setting. RECOMMEND - Referral to outpatient nutrition services for weight management after D/C.

## 2020-12-08 NOTE — ASSESSMENT & PLAN NOTE
I spent 3 minutes, discussing tobacco dependence and cardiac as well as pulmonary risk. Smoking cessation instructions. Code 94552

## 2020-12-08 NOTE — PROGRESS NOTES
Pt arrived via gurney, admitted to room 204-2 from ED at 2225. Pt is A&Ox4, c/o BLE pain reported at 4 on a scale of 0-10. Oriented to room call light and smoking policy. Reviewed plan of care with the patient. Fall precaution in place. Bed locked. Bet at lowest position. Call light within reach. Encouraged pt the importance to call for assistance.

## 2020-12-09 ENCOUNTER — PATIENT OUTREACH (OUTPATIENT)
Dept: HEALTH INFORMATION MANAGEMENT | Facility: OTHER | Age: 50
End: 2020-12-09

## 2020-12-09 VITALS
HEART RATE: 79 BPM | BODY MASS INDEX: 57.97 KG/M2 | WEIGHT: 315 LBS | DIASTOLIC BLOOD PRESSURE: 95 MMHG | OXYGEN SATURATION: 94 % | SYSTOLIC BLOOD PRESSURE: 163 MMHG | RESPIRATION RATE: 16 BRPM | TEMPERATURE: 98.5 F | HEIGHT: 62 IN

## 2020-12-09 LAB
ANION GAP SERPL CALC-SCNC: 15 MMOL/L (ref 7–16)
BASOPHILS # BLD AUTO: 0.4 % (ref 0–1.8)
BASOPHILS # BLD: 0.03 K/UL (ref 0–0.12)
BUN SERPL-MCNC: 9 MG/DL (ref 8–22)
CALCIUM SERPL-MCNC: 8.9 MG/DL (ref 8.4–10.2)
CHLORIDE SERPL-SCNC: 101 MMOL/L (ref 96–112)
CO2 SERPL-SCNC: 25 MMOL/L (ref 20–33)
CREAT SERPL-MCNC: 0.76 MG/DL (ref 0.5–1.4)
EOSINOPHIL # BLD AUTO: 0.48 K/UL (ref 0–0.51)
EOSINOPHIL NFR BLD: 7 % (ref 0–6.9)
ERYTHROCYTE [DISTWIDTH] IN BLOOD BY AUTOMATED COUNT: 48.6 FL (ref 35.9–50)
GLUCOSE SERPL-MCNC: 89 MG/DL (ref 65–99)
HCT VFR BLD AUTO: 39.8 % (ref 42–52)
HGB BLD-MCNC: 12.8 G/DL (ref 14–18)
IMM GRANULOCYTES # BLD AUTO: 0.04 K/UL (ref 0–0.11)
IMM GRANULOCYTES NFR BLD AUTO: 0.6 % (ref 0–0.9)
LV EJECT FRACT  99904: 65
LYMPHOCYTES # BLD AUTO: 1.44 K/UL (ref 1–4.8)
LYMPHOCYTES NFR BLD: 20.9 % (ref 22–41)
MCH RBC QN AUTO: 28.5 PG (ref 27–33)
MCHC RBC AUTO-ENTMCNC: 32.2 G/DL (ref 33.7–35.3)
MCV RBC AUTO: 88.6 FL (ref 81.4–97.8)
MONOCYTES # BLD AUTO: 0.81 K/UL (ref 0–0.85)
MONOCYTES NFR BLD AUTO: 11.8 % (ref 0–13.4)
NEUTROPHILS # BLD AUTO: 4.08 K/UL (ref 1.82–7.42)
NEUTROPHILS NFR BLD: 59.3 % (ref 44–72)
NRBC # BLD AUTO: 0 K/UL
NRBC BLD-RTO: 0 /100 WBC
PLATELET # BLD AUTO: 261 K/UL (ref 164–446)
PMV BLD AUTO: 10.2 FL (ref 9–12.9)
POTASSIUM SERPL-SCNC: 3.9 MMOL/L (ref 3.6–5.5)
RBC # BLD AUTO: 4.49 M/UL (ref 4.7–6.1)
SODIUM SERPL-SCNC: 141 MMOL/L (ref 135–145)
WBC # BLD AUTO: 6.9 K/UL (ref 4.8–10.8)

## 2020-12-09 PROCEDURE — 85025 COMPLETE CBC W/AUTO DIFF WBC: CPT

## 2020-12-09 PROCEDURE — 80048 BASIC METABOLIC PNL TOTAL CA: CPT

## 2020-12-09 PROCEDURE — 36415 COLL VENOUS BLD VENIPUNCTURE: CPT

## 2020-12-09 PROCEDURE — 700102 HCHG RX REV CODE 250 W/ 637 OVERRIDE(OP): Performed by: STUDENT IN AN ORGANIZED HEALTH CARE EDUCATION/TRAINING PROGRAM

## 2020-12-09 PROCEDURE — 700111 HCHG RX REV CODE 636 W/ 250 OVERRIDE (IP): Performed by: STUDENT IN AN ORGANIZED HEALTH CARE EDUCATION/TRAINING PROGRAM

## 2020-12-09 PROCEDURE — A9270 NON-COVERED ITEM OR SERVICE: HCPCS | Performed by: INTERNAL MEDICINE

## 2020-12-09 PROCEDURE — 93306 TTE W/DOPPLER COMPLETE: CPT | Mod: 26 | Performed by: INTERNAL MEDICINE

## 2020-12-09 PROCEDURE — 99239 HOSP IP/OBS DSCHRG MGMT >30: CPT | Performed by: STUDENT IN AN ORGANIZED HEALTH CARE EDUCATION/TRAINING PROGRAM

## 2020-12-09 PROCEDURE — A9270 NON-COVERED ITEM OR SERVICE: HCPCS | Performed by: STUDENT IN AN ORGANIZED HEALTH CARE EDUCATION/TRAINING PROGRAM

## 2020-12-09 PROCEDURE — 700102 HCHG RX REV CODE 250 W/ 637 OVERRIDE(OP): Performed by: INTERNAL MEDICINE

## 2020-12-09 RX ORDER — FUROSEMIDE 10 MG/ML
40 INJECTION INTRAMUSCULAR; INTRAVENOUS ONCE
Status: COMPLETED | OUTPATIENT
Start: 2020-12-09 | End: 2020-12-09

## 2020-12-09 RX ORDER — AMOXICILLIN AND CLAVULANATE POTASSIUM 875; 125 MG/1; MG/1
1 TABLET, FILM COATED ORAL EVERY 12 HOURS
Qty: 10 TAB | Refills: 0 | Status: SHIPPED | OUTPATIENT
Start: 2020-12-09 | End: 2020-12-14

## 2020-12-09 RX ADMIN — FUROSEMIDE 20 MG: 20 TABLET ORAL at 05:18

## 2020-12-09 RX ADMIN — FUROSEMIDE 40 MG: 10 INJECTION, SOLUTION INTRAMUSCULAR; INTRAVENOUS at 10:25

## 2020-12-09 RX ADMIN — METOPROLOL SUCCINATE 25 MG: 25 TABLET, EXTENDED RELEASE ORAL at 05:18

## 2020-12-09 RX ADMIN — OMEPRAZOLE 40 MG: 20 CAPSULE, DELAYED RELEASE ORAL at 05:21

## 2020-12-09 RX ADMIN — AMOXICILLIN AND CLAVULANATE POTASSIUM 1 TABLET: 875; 125 TABLET, FILM COATED ORAL at 05:18

## 2020-12-09 RX ADMIN — RIVAROXABAN 20 MG: 20 TABLET, FILM COATED ORAL at 05:18

## 2020-12-09 ASSESSMENT — ENCOUNTER SYMPTOMS
HEADACHES: 0
GASTROINTESTINAL NEGATIVE: 1
TINGLING: 1
NECK PAIN: 0
DEPRESSION: 0
CHILLS: 0
WEAKNESS: 1
MYALGIAS: 0
RESPIRATORY NEGATIVE: 1
EYES NEGATIVE: 1
DIZZINESS: 0
FEVER: 0
CARDIOVASCULAR NEGATIVE: 1

## 2020-12-09 ASSESSMENT — PAIN DESCRIPTION - PAIN TYPE: TYPE: ACUTE PAIN

## 2020-12-09 NOTE — PROGRESS NOTES
Hospital Medicine Daily Progress Note    Date of Service  12/9/2020    Chief Complaint  50 y.o. male admitted 12/7/2020 with lower extremity edema    Hospital Course  50 y.o. male with a past medical history of asthma/COPD on inhalers, NINO supposed on CPAP, currently smoke, HTN, pulmonary embolism on Xarelto, Meth use, chronic LE edema and obesity who presented 12/7/2020 with Low Back Pain and Wound Re-Check    Interval Problem Update  AAOX4, afebrile and in NAD  Admits to remote meth use  Clinical picture concerning for new onset R-HF  CXR w/o edema  Pending ECHO  Labs on AM    Consultants/Specialty  None    Code Status  Full Code    Disposition  Pending ECHO    Review of Systems  Review of Systems   Constitutional: Negative for chills and fever.   HENT: Negative.    Eyes: Negative.    Respiratory: Negative.    Cardiovascular: Negative.    Gastrointestinal: Negative.    Genitourinary: Negative.    Musculoskeletal: Negative for myalgias and neck pain.   Skin: Negative for itching and rash.   Neurological: Positive for tingling and weakness. Negative for dizziness and headaches.   Psychiatric/Behavioral: Negative for depression and suicidal ideas.        Physical Exam  Temp:  [36.7 °C (98.1 °F)-37.7 °C (99.8 °F)] 36.7 °C (98.1 °F)  Pulse:  [79-80] 79  Resp:  [15-17] 17  BP: (133-148)/(77-83) 133/77  SpO2:  [90 %-92 %] 90 %    Physical Exam  Constitutional:       Appearance: Normal appearance. He is obese. He is not ill-appearing.   HENT:      Head: Normocephalic and atraumatic.      Mouth/Throat:      Mouth: Mucous membranes are moist.   Eyes:      Extraocular Movements: Extraocular movements intact.      Pupils: Pupils are equal, round, and reactive to light.   Neck:      Musculoskeletal: Normal range of motion and neck supple.   Cardiovascular:      Rate and Rhythm: Normal rate and regular rhythm.      Heart sounds: No murmur.   Pulmonary:      Effort: Pulmonary effort is normal.      Breath sounds: Normal breath  sounds.   Abdominal:      General: Bowel sounds are normal. There is distension.      Palpations: Abdomen is soft.      Tenderness: There is no abdominal tenderness.   Musculoskeletal:         General: Swelling, tenderness and deformity present.      Right lower leg: Edema present.      Left lower leg: Edema present.   Skin:     General: Skin is warm.      Findings: Bruising, erythema and rash present.   Neurological:      General: No focal deficit present.      Mental Status: He is alert and oriented to person, place, and time. Mental status is at baseline.      Cranial Nerves: No cranial nerve deficit.   Psychiatric:         Mood and Affect: Mood normal.         Behavior: Behavior normal.         Thought Content: Thought content normal.         Judgment: Judgment normal.         Fluids    Intake/Output Summary (Last 24 hours) at 12/9/2020 0750  Last data filed at 12/9/2020 0700  Gross per 24 hour   Intake --   Output 2450 ml   Net -2450 ml       Laboratory  Recent Labs     12/07/20  1455 12/08/20  0213 12/09/20  0239   WBC 6.9 7.2 6.9   RBC 4.77 4.40* 4.49*   HEMOGLOBIN 13.9* 13.0* 12.8*   HEMATOCRIT 42.3 39.4* 39.8*   MCV 88.7 89.5 88.6   MCH 29.1 29.5 28.5   MCHC 32.9* 33.0* 32.2*   RDW 46.7 48.0 48.6   PLATELETCT 259 248 261   MPV 9.8 9.8 10.2     Recent Labs     12/07/20  1455 12/08/20  0213 12/09/20  0239   SODIUM 138 138 141   POTASSIUM 3.3* 3.4* 3.9   CHLORIDE 97 99 101   CO2 29 27 25   GLUCOSE 106* 131* 89   BUN 10 10 9   CREATININE 0.79 0.86 0.76   CALCIUM 9.1 8.7 8.9     Recent Labs     12/07/20  1455   INR 1.05               Imaging  EC-ECHOCARDIOGRAM COMPLETE W/O CONT         DX-CHEST-PORTABLE (1 VIEW)   Final Result      No acute cardiopulmonary abnormality identified.      CT-LSPINE W/O PLUS RECONS   Final Result      1.  No acute fracture is identified.      2.  Multilevel degenerative disc disease.      3.  Degenerative retrolisthesis at L2-3 and L3-4.      4.  Facet arthropathy in the lower lumbar  spine.              Assessment/Plan  * Lower extremity cellulitis  Assessment & Plan  Switched to PO Augmentin  Pending ECHO    NINO (obstructive sleep apnea)  Assessment & Plan  Supposed to be on CPAP but noncompliant  Ordered nocturnal oximetry.    Chronic anticoagulation  Assessment & Plan  Continue    History of pulmonary embolism  Assessment & Plan  On Xarelto. Continue    Tobacco dependence  Assessment & Plan  I spent 3 minutes, discussing tobacco dependence and cardiac as well as pulmonary risk. Smoking cessation instructions. Code 66258      Morbid obesity (HCC)  Assessment & Plan  When better, exercise, lifestyle modification, weight loss. He says he sees Dr. Leal, considering bariatric intervention       VTE prophylaxis: Xarelto

## 2020-12-09 NOTE — PROGRESS NOTES
Discharge paperwork reviewed with patient at bedside. Copy given. Questions encouraged and answered. Patient given resources for follow up provider and fluid restriction guidelines. IV removed. Pain controlled, VSS. Patient escorted to ED entrance via wheelchair. Patient discharged to home in [place].

## 2020-12-09 NOTE — DISCHARGE INSTRUCTIONS
Discharge Instructions    Discharged to home by car with relative. Discharged via wheelchair, hospital escort: Yes.  Special equipment needed: Cane    Be sure to schedule a follow-up appointment with your primary care doctor or any specialists as instructed.     Discharge Plan:   Diet Plan: Discussed  Activity Level: Discussed  Confirmed Follow up Appointment: Patient to Call and Schedule Appointment  Confirmed Symptoms Management: Discussed  Medication Reconciliation Updated: No (Comments)    I understand that a diet low in cholesterol, fat, and sodium is recommended for good health. Unless I have been given specific instructions below for another diet, I accept this instruction as my diet prescription.   Other diet: Regualr Diet- Fluid restriction to 1500 mL    Special Instructions:     HF Patient Discharge Instructions  · Monitor your weight daily, and maintain a weight chart, to track your weight changes.   · Activity as tolerated, unless your Doctor has ordered otherwise. Other activity order: .  · Follow a low fat, low cholesterol, low salt diet unless instructed otherwise by your Doctor. Read the labels on the back of food products and track your intake of fat, cholesterol and salt.   · Fluid Restriction Yes. If a Fluid Restriction has been ordered by your Doctor, measure fluids with a measuring cup to ensure that you are not exceeding the restriction.   · No smoking.  · Oxygen No. If your Doctor has ordered that you wear Oxygen at home, it is important to wear it as ordered.  · Did you receive an explanation from staff on the importance of taking each of your medications and why it is necessary to stay on the medications the physician/care provider has ordered? Yes  · Do you have any questions concerning how to manage your heart failure and what to do should you have any increased signs and symptoms after you go home? Yes  · Do you feel like your heart failure care team involved you in the care treatment  plan and allowed you to make decisions regarding your care while in the hospital and addressed any discharge needs you might have? Yes    See the educational handout provided at discharge for more information on monitoring your daily weight, activity and diet. This also explains more about Heart Failure, symptoms of a flare-up and some of the tests that you have undergone.     Warning Signs of a Flare-Up include:  · Swelling in the ankles or lower legs.  · Shortness of breath, while at rest, or while doing normal activities.   · Shortness of breath at night when in bed, or coughing in bed.   · Requiring more pillows to sleep at night, or needing to sit up at night to sleep.  · Feeling weak, dizzy or fatigued.     When to call your Doctor:  · Call [facility] about questions regarding the discharge instructions you were given [phone].  (Discharge Unit General surgery)  · Call your Primary Care Physician or Cardiologist if:   1. You experience any pain radiating to your jaw or neck.  2. You have any difficulty breathing.  3. You experience weight gain of 2 lbs in a day or 5 lbs in a week.   4. You feel any palpitations or irregular heartbeats.  5. You become dizzy or lose consciousness.   If you have had an angiogram or had a pacemaker or AICD placed, and experience:  1. Bleeding, drainage or swelling at the surgical / puncture site.  2. Fever greater than 100.0 F  3. Shock from internal defibrillator.  4. Cool and / or numb extremities.      · Is patient discharged on Warfarin / Coumadin?   No     Depression / Suicide Risk    As you are discharged from this [facility], it is important to learn how to keep safe from harming yourself.    Recognize the warning signs:  · Abrupt changes in personality, positive or negative- including increase in energy   · Giving away possessions  · Change in eating patterns- significant weight changes-  positive or negative  · Change in sleeping  patterns- unable to sleep or sleeping all the time   · Unwillingness or inability to communicate  · Depression  · Unusual sadness, discouragement and loneliness  · Talk of wanting to die  · Neglect of personal appearance   · Rebelliousness- reckless behavior  · Withdrawal from people/activities they love  · Confusion- inability to concentrate     If you or a loved one observes any of these behaviors or has concerns about self-harm, here's what you can do:  · Talk about it- your feelings and reasons for harming yourself  · Remove any means that you might use to hurt yourself (examples: pills, rope, extension cords, firearm)  · Get professional help from the community (Mental Health, Substance Abuse, psychological counseling)  · Do not be alone:Call your Safe Contact- someone whom you trust who will be there for you.  · Call your local CRISIS HOTLINE 282-9566 or 769-197-7397  · Call your local Children's Mobile Crisis Response Team [place] (538) 800-2838 or www.[place]  · Call the toll free National Suicide Prevention Hotlines   · National Suicide Prevention Lifeline 159-584-MZZK (9066)  · National Hope Line Network 800-SUICIDE (786-6285)

## 2020-12-10 NOTE — DISCHARGE SUMMARY
Discharge Summary    CHIEF COMPLAINT ON ADMISSION  Chief Complaint   Patient presents with   • Low Back Pain   • Wound Re-Check       Reason for Admission  Kidney Pain     Admission Date  12/7/2020    CODE STATUS  Prior    HPI & HOSPITAL COURSE  50 y.o. male with a past medical history of asthma/COPD on inhalers, NINO supposed on CPAP, currently smoke, HTN, pulmonary embolism on Xarelto, Meth use, chronic LE edema and obesity was admitted on 12/7/2020 for lower extremity cellulitis.  He was started on ceftriaxone antibiotic regimen that was ultimately transitioned to Augmentin antibiotic regimen.  Due to patient's history of methamphetamine use in conjunction with history of COPD and NINO, echocardiogram was pursued to look for right heart failure.  Patient was noted for mildly dilated right ventricle, enlarged right atrium and dilated inferior vena cava with inspiratory collapse.  As such, patient was diuresed with Lasix and is to continue on p.o. Lasix as an outpatient.  Patient was instructed to establish with a primary care provider within 2 weeks who is to follow his pulmonary hypertension.  All results and plan of action were discussed with the patient for which she voiced understanding and agreed with the primary care team.  Patient was instructed to return to emergency department symptoms were to worsen.    Therefore, he is discharged in good and stable condition to home with close outpatient follow-up.    The patient met 2-midnight criteria for an inpatient stay at the time of discharge.    Discharge Date  12/9/2020    FOLLOW UP ITEMS POST DISCHARGE  Primary care provider to follow pulmonary hypertension and CPAP compliance    DISCHARGE DIAGNOSES  Principal Problem:    Lower extremity cellulitis POA: Unknown  Active Problems:    Tobacco dependence POA: Unknown    History of pulmonary embolism POA: Unknown    Chronic anticoagulation POA: Unknown    NINO (obstructive sleep apnea) POA: Unknown    Morbid obesity  (HCC) POA: Unknown  Resolved Problems:    * No resolved hospital problems. *      FOLLOW UP  No future appointments.  [address] [address] [phone]  Schedule an appointment as soon as possible for a visit  to establish with a primary care provider. This office offers discounts for medical care based on your income.       MEDICATIONS ON DISCHARGE     Medication List      START taking these medications      Instructions   amoxicillin-clavulanate 875-125 MG Tabs  Commonly known as: AUGMENTIN   Take 1 Tab by mouth every 12 hours for 5 days.  Dose: 1 Tab        CHANGE how you take these medications      Instructions   metoprolol SR 25 MG Tb24  What changed: when to take this  Commonly known as: TOPROL XL   Take 1 Tab by mouth every day.  Dose: 25 mg     omeprazole 40 MG delayed-release capsule  What changed:   · when to take this  · additional instructions  Commonly known as: PRILOSEC   Take 1 Cap by mouth 2 times a day. For 7 days, qday thereafter.  Dose: 40 mg        CONTINUE taking these medications      Instructions   acetaminophen 500 MG Tabs  Commonly known as: TYLENOL   Take 1,000 mg by mouth every 6 hours as needed. Indications: Pain  Dose: 1,000 mg     furosemide 20 MG Tabs  Commonly known as: LASIX   Take 20 mg by mouth every morning.  Dose: 20 mg     Xarelto 20 MG Tabs tablet  Generic drug: rivaroxaban   Take 20 mg by mouth every morning.  Dose: 20 mg            Allergies  No Known Allergies    DIET  No orders of the defined types were placed in this encounter.      ACTIVITY  As tolerated.  Weight bearing as tolerated    CONSULTATIONS  None    PROCEDURES  None    LABORATORY  Lab Results   Component Value Date    SODIUM 141 12/09/2020    POTASSIUM 3.9 12/09/2020    CHLORIDE 101 12/09/2020    CO2 25 12/09/2020    GLUCOSE 89 12/09/2020    BUN 9 12/09/2020    CREATININE 0.76 12/09/2020        Lab Results   Component Value Date    WBC 6.9 12/09/2020     HEMOGLOBIN 12.8 (L) 12/09/2020    HEMATOCRIT 39.8 (L) 12/09/2020    PLATELETCT 261 12/09/2020        Total time of the discharge process exceeds 35 minutes.

## 2020-12-12 LAB
BACTERIA BLD CULT: NORMAL
BACTERIA BLD CULT: NORMAL
SIGNIFICANT IND 70042: NORMAL
SIGNIFICANT IND 70042: NORMAL
SITE SITE: NORMAL
SITE SITE: NORMAL
SOURCE SOURCE: NORMAL
SOURCE SOURCE: NORMAL

## 2021-01-26 ENCOUNTER — TELEPHONE (OUTPATIENT)
Dept: CARDIOLOGY | Facility: MEDICAL CENTER | Age: 51
End: 2021-01-26

## 2021-01-26 NOTE — TELEPHONE ENCOUNTER
Chart Prep      LVM regarding appt to see if pt has been seen by any cardiologist before or has done any cardio testing but no answer gave call back number at [phone]

## 2021-02-04 ENCOUNTER — OFFICE VISIT (OUTPATIENT)
Dept: CARDIOLOGY | Facility: MEDICAL CENTER | Age: 51
End: 2021-02-04
Payer: MEDICAID

## 2021-02-04 VITALS
DIASTOLIC BLOOD PRESSURE: 80 MMHG | WEIGHT: 315 LBS | SYSTOLIC BLOOD PRESSURE: 122 MMHG | OXYGEN SATURATION: 92 % | HEIGHT: 66 IN | HEART RATE: 89 BPM | BODY MASS INDEX: 50.62 KG/M2

## 2021-02-04 DIAGNOSIS — E66.01 MORBID OBESITY (HCC): ICD-10-CM

## 2021-02-04 DIAGNOSIS — Z79.01 CHRONIC ANTICOAGULATION: ICD-10-CM

## 2021-02-04 DIAGNOSIS — F15.11 METHAMPHETAMINE ABUSE IN REMISSION (HCC): ICD-10-CM

## 2021-02-04 DIAGNOSIS — R60.0 LOCALIZED EDEMA: ICD-10-CM

## 2021-02-04 PROCEDURE — 99244 OFF/OP CNSLTJ NEW/EST MOD 40: CPT | Performed by: INTERNAL MEDICINE

## 2021-02-04 RX ORDER — POTASSIUM CITRATE 10 MEQ/1
10 TABLET, EXTENDED RELEASE ORAL DAILY
COMMUNITY

## 2021-02-04 RX ORDER — BUMETANIDE 1 MG/1
1 TABLET ORAL DAILY
Qty: 30 TAB | Refills: 6 | Status: SHIPPED | OUTPATIENT
Start: 2021-02-04 | End: 2021-05-28

## 2021-02-04 RX ORDER — POTASSIUM CITRATE 5 MEQ/1
5 TABLET, EXTENDED RELEASE ORAL DAILY
COMMUNITY

## 2021-02-04 RX ORDER — NIFEDIPINE 30 MG/1
30 TABLET, EXTENDED RELEASE ORAL DAILY
COMMUNITY
End: 2021-02-04

## 2021-02-04 RX ORDER — LOSARTAN POTASSIUM 50 MG/1
50 TABLET ORAL DAILY
Qty: 30 TAB | Refills: 6 | Status: SHIPPED | OUTPATIENT
Start: 2021-02-04 | End: 2021-05-28

## 2021-02-04 ASSESSMENT — ENCOUNTER SYMPTOMS
GASTROINTESTINAL NEGATIVE: 1
NEUROLOGICAL NEGATIVE: 1
PALPITATIONS: 0
SHORTNESS OF BREATH: 0
RESPIRATORY NEGATIVE: 1
WEIGHT LOSS: 1
BRUISES/BLEEDS EASILY: 0

## 2021-02-04 ASSESSMENT — FIBROSIS 4 INDEX: FIB4 SCORE: 0.87

## 2021-02-04 NOTE — LETTER
[facility]   [address] [place] [address] [address]  [place] [address]  Phone: [phone]  Fax: [phone]              Jose Carlos Khan  1970    Encounter Date: 2021    Charan Grant M.D.          PROGRESS NOTE:  Chief Complaint   Patient presents with   • New Patient   • HTN (Controlled)       Subjective:   Jose Carlos Khan is a 50 y.o. male who is seen in consultation at the request of Randall Carney PA-C.  The referral request is for hypertension, possible pulmonary hypertension, and right heart failure.    The patient has a history of chronic edema.  He underwent left lower extremity.  [facility] in the 80s has had chronic edema ever since.  He also has history of prior DVT and pulmonary embolism.  Patient has a history of hypertension.  He has history of sleep apnea that is in the process of being evaluated.  He has been using methamphetamine consistently between ages 18 and 47.  He states his last methamphetamine use was approximate 2 months ago.  His mother  of a fentanyl overdose.  The patient is currently taking care of his parents who are ill.  His father has Parkinson's.  He is a chronic cigarette smoker but is down to about 2 cigarettes a day.    He had an echocardiogram performed on 2020.  I have reviewed the report as well as the echo images.  The V systolic function is normal with an EF of 65% diastolic function is normal.  The RV is mildly dilated with normal wall motion, the pulmonary pressure is 35 mmHg.  There is mention of AV rashmi reentry tachycardia on the chart but I find no documentation of this.  There is an EKG from 2019 at 6:45 AM that demonstrates a narrow complex tachycardia at 144 bpm.  In reviewing this tracing,it is not clear if this is an SVT or a sinus tachycardia with first-degree AV block.        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 No pertinent family history.  Social History     Socioeconomic History   • Marital status: Single     Spouse name: Not on file   • Number of children: Not on file   • Years of education: Not on file   • Highest education level: Not on file   Occupational History   • Not on file   Social Needs   • Financial resource strain: Not on file   • Food insecurity     Worry: Not on file     Inability: Not on file   • Transportation needs     Medical: Not on file     Non-medical: Not on file   Tobacco Use   • Smoking status: Current Every Day Smoker     Packs/day: 1.00   • Smokeless tobacco: Never Used   Substance and Sexual Activity   • Alcohol use: Yes     Comment: occ   • Drug use: No   • Sexual activity: Not on file   Lifestyle   • Physical activity     Days per week: Not on file     Minutes per session: Not on file   • Stress: Not on file   Relationships   • Social connections     Talks on phone: Not on file     Gets together: Not on file     Attends [employer] service: Not on file     Active member of club or organization: Not on file     Attends meetings of clubs or organizations: Not on file     Relationship status: Not on file   • Intimate partner violence     Fear of current or ex partner: Not on file     Emotionally abused: Not on file     Physically abused: Not on file     Forced sexual activity: Not on file   Other Topics Concern   • Not on file   Social History Narrative   • Not on file     No Known Allergies  Outpatient Encounter Medications as of 2/4/2021   Medication Sig Dispense Refill   • potassium citrate (UROCIT-K) 5 MEQ (540 MG) Tab CR Take 5 mEq by mouth every day.     • potassium citrate SR (UROCIT-K SR) 10 MEQ (1080 MG) Tab CR Take 10 mEq by mouth every day.     •  "bumetanide (BUMEX) 1 MG Tab Take 1 Tab by mouth every day. 30 Tab 6   • losartan (COZAAR) 50 MG Tab Take 1 Tab by mouth every day. 30 Tab 6   • rivaroxaban (XARELTO) 20 MG Tab tablet Take 20 mg by mouth every morning.     • furosemide (LASIX) 20 MG Tab Take 20 mg by mouth every morning.     • acetaminophen (TYLENOL) 500 MG Tab Take 1,000 mg by mouth every 6 hours as needed. Indications: Pain     • metoprolol SR (TOPROL XL) 25 MG TABLET SR 24 HR Take 1 Tab by mouth every day. (Patient taking differently: Take 25 mg by mouth every morning.) 30 Tab 3   • omeprazole (PRILOSEC) 40 MG delayed-release capsule Take 1 Cap by mouth 2 times a day. For 7 days, qday thereafter. (Patient taking differently: Take 40 mg by mouth every day. ONCE DAILY) 90 Cap 0   • [DISCONTINUED] NIFEdipine SR (PROCARDIA-XL) 30 MG tablet Take 30 mg by mouth every day.       No facility-administered encounter medications on file as of 2/4/2021.      Review of Systems   Constitutional: Positive for weight loss.   HENT: Negative.    Respiratory: Negative.  Negative for shortness of breath.         History of sleep apnea.  He is in the process of being evaluated.    History of pulmonary embolism   Cardiovascular: Positive for leg swelling. Negative for chest pain and palpitations.        History of DVT   Gastrointestinal: Negative.    Musculoskeletal: Positive for joint pain.   Skin: Negative.    Neurological: Negative.    Endo/Heme/Allergies: Negative.  Does not bruise/bleed easily.   Psychiatric/Behavioral:        History of methamphetamine use from age 17 to age 47.    States that he has been abstinent for approximately 2 months.        Objective:   /80 (BP Location: Left arm, Patient Position: Sitting, BP Cuff Size: Adult)   Pulse 89   Ht 1.676 m (5' 6\")   Wt (!) 148 kg (327 lb)   SpO2 92%   BMI 52.78 kg/m²     Physical Exam   Constitutional: He is oriented to person, place, and time. He appears well-nourished. No distress.   Obese "   HENT:   Head: Normocephalic and atraumatic.   Eyes: Pupils are equal, round, and reactive to light. No scleral icterus.   Neck: No JVD present. No tracheal deviation present.   Cardiovascular: Normal rate and regular rhythm.   No murmur heard.  Pulmonary/Chest: Breath sounds normal. No respiratory distress. He has no wheezes.   Abdominal: Soft. Bowel sounds are normal. He exhibits no distension. There is no abdominal tenderness.   Obese   Musculoskeletal:         General: Edema present.      Comments: 2+ on the left, 1+ on the right   Neurological: He is alert and oriented to person, place, and time.   Skin: Skin is warm and dry.   Psychiatric: He has a normal mood and affect.       Assessment:     1. Morbid obesity ([facility])  REFERRAL TO DIABETIC EDUCATION   2. Chronic anticoagulation  REFERRAL TO DIABETIC EDUCATION   3. Localized edema  REFERRAL TO DIABETIC EDUCATION   4. Methamphetamine abuse in remission ([facility])         Medical Decision Making:  Today's Assessment / Status / Plan:   Edema: Multifactorial and due in part to previous left lower leg surgery, history of DVT, obesity, inactivity and untreated sleep apnea.  As noted above, his left heart systolic and diastolic function is unremarkable.    There is no evidence of right heart failure by echo and the pulmonary pressures are only mildly elevated.    The patient is on nifedipine for hypertension which can aggravate his edema.  Nonpharmacologic methods of edema reduction were discussed including including weight loss, leg elevation, and use of compression stockings.  He will be taken off nifedipine and started on losartan for blood pressure control    Obesity: This is been a long-term problem with the patient.  Refer to dietary team for some help with his diet.  He is currently getting his mother's Meals on Wheels diabetic meals that are brought to the house.    Sleep apnea: He is a process of being evaluated    Methamphetamine abuse: He has a long history of  methamphetamine abuse starting at age 18, but has been absent a couple months.  He states that, in the past, he and his parents would use methamphetamine together.  Continued abstinence from methamphetamine was strongly encouraged.    The patient problems edema are multifactorial as outlined above, and not due to left heart failure.    He was given written instructions as to discontinue his nifedipine as well as his furosemide.  He will be started on bumetanide 1 mg a day which will hopefully be absorbed better and be more efficacious.  He will be started on losartan 50 mg a day for blood pressure control.  (Written instructions regarding the new medications were given to the patient)    The patient will continue his current dose of potassium.    Nonpharmacologic measures of edema control were discussed including weight loss leg elevation and compression stockings.  Referred to dietary for counseling regarding weight    Return in 1 month to reevaluate blood pressure on his change medications and to reevaluate his edema.    S his problems are noncardiac in nature he will be seen on an as-needed basis only after the next visit.    Discussed nonpharmacologic treatment          No Recipients

## 2021-02-04 NOTE — PROGRESS NOTES
Chief Complaint   Patient presents with   • New Patient   • HTN (Controlled)       Subjective:   Jose Carlos Khan is a 50 y.o. male who is seen in consultation at the request of Randall Carney PA-C.  The referral request is for hypertension, possible pulmonary hypertension, and right heart failure.    The patient has a history of chronic edema.  He underwent left lower extremity.  [facility] in the 80s has had chronic edema ever since.  He also has history of prior DVT and pulmonary embolism.  Patient has a history of hypertension.  He has history of sleep apnea that is in the process of being evaluated.  He has been using methamphetamine consistently between ages 18 and 47.  He states his last methamphetamine use was approximate 2 months ago.  His mother  of a fentanyl overdose.  The patient is currently taking care of his parents who are ill.  His father has Parkinson's.  He is a chronic cigarette smoker but is down to about 2 cigarettes a day.    He had an echocardiogram performed on 2020.  I have reviewed the report as well as the echo images.  The V systolic function is normal with an EF of 65% diastolic function is normal.  The RV is mildly dilated with normal wall motion, the pulmonary pressure is 35 mmHg.  There is mention of AV rashmi reentry tachycardia on the chart but I find no documentation of this.  There is an EKG from 2019 at 6:45 AM that demonstrates a narrow complex tachycardia at 144 bpm.  In reviewing this tracing,it is not clear if this is an SVT or a sinus tachycardia with first-degree AV block.        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 No pertinent family  history.  Social History     Socioeconomic History   • Marital status: Single     Spouse name: Not on file   • Number of children: Not on file   • Years of education: Not on file   • Highest education level: Not on file   Occupational History   • Not on file   Social Needs   • Financial resource strain: Not on file   • Food insecurity     Worry: Not on file     Inability: Not on file   • Transportation needs     Medical: Not on file     Non-medical: Not on file   Tobacco Use   • Smoking status: Current Every Day Smoker     Packs/day: 1.00   • Smokeless tobacco: Never Used   Substance and Sexual Activity   • Alcohol use: Yes     Comment: occ   • Drug use: No   • Sexual activity: Not on file   Lifestyle   • Physical activity     Days per week: Not on file     Minutes per session: Not on file   • Stress: Not on file   Relationships   • Social connections     Talks on phone: Not on file     Gets together: Not on file     Attends Buddhism service: Not on file     Active member of club or organization: Not on file     Attends meetings of clubs or organizations: Not on file     Relationship status: Not on file   • Intimate partner violence     Fear of current or ex partner: Not on file     Emotionally abused: Not on file     Physically abused: Not on file     Forced sexual activity: Not on file   Other Topics Concern   • Not on file   Social History Narrative   • Not on file     No Known Allergies  Outpatient Encounter Medications as of 2/4/2021   Medication Sig Dispense Refill   • potassium citrate (UROCIT-K) 5 MEQ (540 MG) Tab CR Take 5 mEq by mouth every day.     • potassium citrate SR (UROCIT-K SR) 10 MEQ (1080 MG) Tab CR Take 10 mEq by mouth every day.     • bumetanide (BUMEX) 1 MG Tab Take 1 Tab by mouth every day. 30 Tab 6   • losartan (COZAAR) 50 MG Tab Take 1 Tab by mouth every day. 30 Tab 6   • rivaroxaban (XARELTO) 20 MG Tab tablet Take 20 mg by mouth every morning.     • furosemide (LASIX) 20 MG Tab Take 20 mg  "by mouth every morning.     • acetaminophen (TYLENOL) 500 MG Tab Take 1,000 mg by mouth every 6 hours as needed. Indications: Pain     • metoprolol SR (TOPROL XL) 25 MG TABLET SR 24 HR Take 1 Tab by mouth every day. (Patient taking differently: Take 25 mg by mouth every morning.) 30 Tab 3   • omeprazole (PRILOSEC) 40 MG delayed-release capsule Take 1 Cap by mouth 2 times a day. For 7 days, qday thereafter. (Patient taking differently: Take 40 mg by mouth every day. ONCE DAILY) 90 Cap 0   • [DISCONTINUED] NIFEdipine SR (PROCARDIA-XL) 30 MG tablet Take 30 mg by mouth every day.       No facility-administered encounter medications on file as of 2/4/2021.      Review of Systems   Constitutional: Positive for weight loss.   HENT: Negative.    Respiratory: Negative.  Negative for shortness of breath.         History of sleep apnea.  He is in the process of being evaluated.    History of pulmonary embolism   Cardiovascular: Positive for leg swelling. Negative for chest pain and palpitations.        History of DVT   Gastrointestinal: Negative.    Musculoskeletal: Positive for joint pain.   Skin: Negative.    Neurological: Negative.    Endo/Heme/Allergies: Negative.  Does not bruise/bleed easily.   Psychiatric/Behavioral:        History of methamphetamine use from age 17 to age 47.    States that he has been abstinent for approximately 2 months.        Objective:   /80 (BP Location: Left arm, Patient Position: Sitting, BP Cuff Size: Adult)   Pulse 89   Ht 1.676 m (5' 6\")   Wt (!) 148 kg (327 lb)   SpO2 92%   BMI 52.78 kg/m²     Physical Exam   Constitutional: He is oriented to person, place, and time. He appears well-nourished. No distress.   Obese   HENT:   Head: Normocephalic and atraumatic.   Eyes: Pupils are equal, round, and reactive to light. No scleral icterus.   Neck: No JVD present. No tracheal deviation present.   Cardiovascular: Normal rate and regular rhythm.   No murmur heard.  Pulmonary/Chest: Breath " sounds normal. No respiratory distress. He has no wheezes.   Abdominal: Soft. Bowel sounds are normal. He exhibits no distension. There is no abdominal tenderness.   Obese   Musculoskeletal:         General: Edema present.      Comments: 2+ on the left, 1+ on the right   Neurological: He is alert and oriented to person, place, and time.   Skin: Skin is warm and dry.   Psychiatric: He has a normal mood and affect.       Assessment:     1. Morbid obesity ([facility])  REFERRAL TO DIABETIC EDUCATION   2. Chronic anticoagulation  REFERRAL TO DIABETIC EDUCATION   3. Localized edema  REFERRAL TO DIABETIC EDUCATION   4. Methamphetamine abuse in remission ([facility])         Medical Decision Making:  Today's Assessment / Status / Plan:   Edema: Multifactorial and due in part to previous left lower leg surgery, history of DVT, obesity, inactivity and untreated sleep apnea.  As noted above, his left heart systolic and diastolic function is unremarkable.    There is no evidence of right heart failure by echo and the pulmonary pressures are only mildly elevated.    The patient is on nifedipine for hypertension which can aggravate his edema.  Nonpharmacologic methods of edema reduction were discussed including including weight loss, leg elevation, and use of compression stockings.  He will be taken off nifedipine and started on losartan for blood pressure control    Obesity: This is been a long-term problem with the patient.  Refer to dietary team for some help with his diet.  He is currently getting his mother's Meals on Wheels diabetic meals that are brought to the house.    Sleep apnea: He is a process of being evaluated    Methamphetamine abuse: He has a long history of methamphetamine abuse starting at age 18, but has been absent a couple months.  He states that, in the past, he and his parents would use methamphetamine together.  Continued abstinence from methamphetamine was strongly encouraged.    The patient problems edema are  multifactorial as outlined above, and not due to left heart failure.    He was given written instructions as to discontinue his nifedipine as well as his furosemide.  He will be started on bumetanide 1 mg a day which will hopefully be absorbed better and be more efficacious.  He will be started on losartan 50 mg a day for blood pressure control.  (Written instructions regarding the new medications were given to the patient)    The patient will continue his current dose of potassium.    Nonpharmacologic measures of edema control were discussed including weight loss leg elevation and compression stockings.  Referred to dietary for counseling regarding weight    Return in 1 month to reevaluate blood pressure on his change medications and to reevaluate his edema.    S his problems are noncardiac in nature he will be seen on an as-needed basis only after the next visit.    Discussed nonpharmacologic treatment

## 2021-02-04 NOTE — LETTER
[facility]   [address] [place] [address] [address]  [place] [address]  Phone: [phone]  Fax: [phone]              Jose Carlos Khan  1970    Encounter Date: 2021    Charan Grant M.D.          PROGRESS NOTE:  Chief Complaint   Patient presents with   • New Patient   • HTN (Controlled)       Subjective:   Jose Carlos Khan is a 50 y.o. male who is seen in consultation at the request of Randall Carney PA-C.  The referral request is for hypertension, possible pulmonary hypertension, and right heart failure.    The patient has a history of chronic edema.  He underwent left lower extremity.  [facility] in the 80s has had chronic edema ever since.  He also has history of prior DVT and pulmonary embolism.  Patient has a history of hypertension.  He has history of sleep apnea that is in the process of being evaluated.  He has been using methamphetamine consistently between ages 18 and 47.  He states his last methamphetamine use was approximate 2 months ago.  His mother  of a fentanyl overdose.  The patient is currently taking care of his parents who are ill.  His father has Parkinson's.  He is a chronic cigarette smoker but is down to about 2 cigarettes a day.    He had an echocardiogram performed on 2020.  I have reviewed the report as well as the echo images.  The V systolic function is normal with an EF of 65% diastolic function is normal.  The RV is mildly dilated with normal wall motion, the pulmonary pressure is 35 mmHg.  There is mention of AV rashmi reentry tachycardia on the chart but I find no documentation of this.  There is an EKG from 2019 at 6:45 AM that demonstrates a narrow complex tachycardia at 144 bpm.  In reviewing this tracing,it is not clear if this is an SVT or a sinus tachycardia with first-degree AV block.        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 No pertinent family history.  Social History     Socioeconomic History   • Marital status: Single     Spouse name: Not on file   • Number of children: Not on file   • Years of education: Not on file   • Highest education level: Not on file   Occupational History   • Not on file   Social Needs   • Financial resource strain: Not on file   • Food insecurity     Worry: Not on file     Inability: Not on file   • Transportation needs     Medical: Not on file     Non-medical: Not on file   Tobacco Use   • Smoking status: Current Every Day Smoker     Packs/day: 1.00   • Smokeless tobacco: Never Used   Substance and Sexual Activity   • Alcohol use: Yes     Comment: occ   • Drug use: No   • Sexual activity: Not on file   Lifestyle   • Physical activity     Days per week: Not on file     Minutes per session: Not on file   • Stress: Not on file   Relationships   • Social connections     Talks on phone: Not on file     Gets together: Not on file     Attends Islam service: Not on file     Active member of club or organization: Not on file     Attends meetings of clubs or organizations: Not on file     Relationship status: Not on file   • Intimate partner violence     Fear of current or ex partner: Not on file     Emotionally abused: Not on file     Physically abused: Not on file     Forced sexual activity: Not on file   Other Topics Concern   • Not on file   Social History Narrative   • Not on file     No Known Allergies  Outpatient Encounter Medications as of 2/4/2021   Medication Sig Dispense Refill   • potassium citrate (UROCIT-K) 5 MEQ (540 MG) Tab CR Take 5 mEq by mouth every day.     • potassium citrate SR (UROCIT-K SR) 10 MEQ (1080 MG) Tab CR Take 10 mEq by mouth every day.     •  "bumetanide (BUMEX) 1 MG Tab Take 1 Tab by mouth every day. 30 Tab 6   • losartan (COZAAR) 50 MG Tab Take 1 Tab by mouth every day. 30 Tab 6   • rivaroxaban (XARELTO) 20 MG Tab tablet Take 20 mg by mouth every morning.     • furosemide (LASIX) 20 MG Tab Take 20 mg by mouth every morning.     • acetaminophen (TYLENOL) 500 MG Tab Take 1,000 mg by mouth every 6 hours as needed. Indications: Pain     • metoprolol SR (TOPROL XL) 25 MG TABLET SR 24 HR Take 1 Tab by mouth every day. (Patient taking differently: Take 25 mg by mouth every morning.) 30 Tab 3   • omeprazole (PRILOSEC) 40 MG delayed-release capsule Take 1 Cap by mouth 2 times a day. For 7 days, qday thereafter. (Patient taking differently: Take 40 mg by mouth every day. ONCE DAILY) 90 Cap 0   • [DISCONTINUED] NIFEdipine SR (PROCARDIA-XL) 30 MG tablet Take 30 mg by mouth every day.       No facility-administered encounter medications on file as of 2/4/2021.      Review of Systems   Constitutional: Positive for weight loss.   HENT: Negative.    Respiratory: Negative.  Negative for shortness of breath.         History of sleep apnea.  He is in the process of being evaluated.    History of pulmonary embolism   Cardiovascular: Positive for leg swelling. Negative for chest pain and palpitations.        History of DVT   Gastrointestinal: Negative.    Musculoskeletal: Positive for joint pain.   Skin: Negative.    Neurological: Negative.    Endo/Heme/Allergies: Negative.  Does not bruise/bleed easily.   Psychiatric/Behavioral:        History of methamphetamine use from age 17 to age 47.    States that he has been abstinent for approximately 2 months.        Objective:   /80 (BP Location: Left arm, Patient Position: Sitting, BP Cuff Size: Adult)   Pulse 89   Ht 1.676 m (5' 6\")   Wt (!) 148 kg (327 lb)   SpO2 92%   BMI 52.78 kg/m²     Physical Exam   Constitutional: He is oriented to person, place, and time. He appears well-nourished. No distress.   Obese "   HENT:   Head: Normocephalic and atraumatic.   Eyes: Pupils are equal, round, and reactive to light. No scleral icterus.   Neck: No JVD present. No tracheal deviation present.   Cardiovascular: Normal rate and regular rhythm.   No murmur heard.  Pulmonary/Chest: Breath sounds normal. No respiratory distress. He has no wheezes.   Abdominal: Soft. Bowel sounds are normal. He exhibits no distension. There is no abdominal tenderness.   Obese   Musculoskeletal:         General: Edema present.      Comments: 2+ on the left, 1+ on the right   Neurological: He is alert and oriented to person, place, and time.   Skin: Skin is warm and dry.   Psychiatric: He has a normal mood and affect.       Assessment:     1. Morbid obesity ([facility])  REFERRAL TO DIABETIC EDUCATION   2. Chronic anticoagulation  REFERRAL TO DIABETIC EDUCATION   3. Localized edema  REFERRAL TO DIABETIC EDUCATION   4. Methamphetamine abuse in remission ([facility])         Medical Decision Making:  Today's Assessment / Status / Plan:   Edema: Multifactorial and due in part to previous left lower leg surgery, history of DVT, obesity, inactivity and untreated sleep apnea.  As noted above, his left heart systolic and diastolic function is unremarkable.    There is no evidence of right heart failure by echo and the pulmonary pressures are only mildly elevated.    The patient is on nifedipine for hypertension which can aggravate his edema.  Nonpharmacologic methods of edema reduction were discussed including including weight loss, leg elevation, and use of compression stockings.  He will be taken off nifedipine and started on losartan for blood pressure control    Obesity: This is been a long-term problem with the patient.  Refer to dietary team for some help with his diet.  He is currently getting his mother's Meals on Wheels diabetic meals that are brought to the house.    Sleep apnea: He is a process of being evaluated    Methamphetamine abuse: He has a long history of  methamphetamine abuse starting at age 18, but has been absent a couple months.  He states that, in the past, he and his parents would use methamphetamine together.  Continued abstinence from methamphetamine was strongly encouraged.    The patient problems edema are multifactorial as outlined above, and not due to left heart failure.    He was given written instructions as to discontinue his nifedipine as well as his furosemide.  He will be started on bumetanide 1 mg a day which will hopefully be absorbed better and be more efficacious.  He will be started on losartan 50 mg a day for blood pressure control.  (Written instructions regarding the new medications were given to the patient)    The patient will continue his current dose of potassium.    Nonpharmacologic measures of edema control were discussed including weight loss leg elevation and compression stockings.  Referred to dietary for counseling regarding weight    Return in 1 month to reevaluate blood pressure on his change medications and to reevaluate his edema.    S his problems are noncardiac in nature he will be seen on an as-needed basis only after the next visit.    Discussed nonpharmacologic treatment        Randall Carney P.A.-C.  [address] [place] [phone]  Via Fax: [phone]

## 2021-02-04 NOTE — LETTER
[facility]   [address] [place] [address] [address]  [place] [address]  Phone: [phone]  Fax: [phone]              Jose Carlos Khan  1970    Encounter Date: 2021    Charan Grant M.D.          PROGRESS NOTE:  Chief Complaint   Patient presents with   • New Patient   • HTN (Controlled)       Subjective:   Jose Carlos Khan is a 50 y.o. male who is seen in consultation at the request of Randall Carney PA-C.  The referral request is for hypertension, possible pulmonary hypertension, and right heart failure.    The patient has a history of chronic edema.  He underwent left lower extremity.  [facility] in the 80s has had chronic edema ever since.  He also has history of prior DVT and pulmonary embolism.  Patient has a history of hypertension.  He has history of sleep apnea that is in the process of being evaluated.  He has been using methamphetamine consistently between ages 18 and 47.  He states his last methamphetamine use was approximate 2 months ago.  His mother  of a fentanyl overdose.  The patient is currently taking care of his parents who are ill.  His father has Parkinson's.  He is a chronic cigarette smoker but is down to about 2 cigarettes a day.    He had an echocardiogram performed on 2020.  I have reviewed the report as well as the echo images.  The V systolic function is normal with an EF of 65% diastolic function is normal.  The RV is mildly dilated with normal wall motion, the pulmonary pressure is 35 mmHg.  There is mention of AV rashmi reentry tachycardia on the chart but I find no documentation of this.  There is an EKG from 2019 at 6:45 AM that demonstrates a narrow complex tachycardia at 144 bpm.  In reviewing this tracing,it is not clear if this is an SVT or a sinus tachycardia with first-degree AV block.        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 No pertinent family history.  Social History     Socioeconomic History   • Marital status: Single     Spouse name: Not on file   • Number of children: Not on file   • Years of education: Not on file   • Highest education level: Not on file   Occupational History   • Not on file   Social Needs   • Financial resource strain: Not on file   • Food insecurity     Worry: Not on file     Inability: Not on file   • Transportation needs     Medical: Not on file     Non-medical: Not on file   Tobacco Use   • Smoking status: Current Every Day Smoker     Packs/day: 1.00   • Smokeless tobacco: Never Used   Substance and Sexual Activity   • Alcohol use: Yes     Comment: occ   • Drug use: No   • Sexual activity: Not on file   Lifestyle   • Physical activity     Days per week: Not on file     Minutes per session: Not on file   • Stress: Not on file   Relationships   • Social connections     Talks on phone: Not on file     Gets together: Not on file     Attends Jehovah's witness service: Not on file     Active member of club or organization: Not on file     Attends meetings of clubs or organizations: Not on file     Relationship status: Not on file   • Intimate partner violence     Fear of current or ex partner: Not on file     Emotionally abused: Not on file     Physically abused: Not on file     Forced sexual activity: Not on file   Other Topics Concern   • Not on file   Social History Narrative   • Not on file     No Known Allergies  Outpatient Encounter Medications as of 2/4/2021   Medication Sig Dispense Refill   • potassium citrate (UROCIT-K) 5 MEQ (540 MG) Tab CR Take 5 mEq by mouth every day.     • potassium citrate SR (UROCIT-K SR) 10 MEQ (1080 MG) Tab CR Take 10 mEq by mouth every day.     •  "bumetanide (BUMEX) 1 MG Tab Take 1 Tab by mouth every day. 30 Tab 6   • losartan (COZAAR) 50 MG Tab Take 1 Tab by mouth every day. 30 Tab 6   • rivaroxaban (XARELTO) 20 MG Tab tablet Take 20 mg by mouth every morning.     • furosemide (LASIX) 20 MG Tab Take 20 mg by mouth every morning.     • acetaminophen (TYLENOL) 500 MG Tab Take 1,000 mg by mouth every 6 hours as needed. Indications: Pain     • metoprolol SR (TOPROL XL) 25 MG TABLET SR 24 HR Take 1 Tab by mouth every day. (Patient taking differently: Take 25 mg by mouth every morning.) 30 Tab 3   • omeprazole (PRILOSEC) 40 MG delayed-release capsule Take 1 Cap by mouth 2 times a day. For 7 days, qday thereafter. (Patient taking differently: Take 40 mg by mouth every day. ONCE DAILY) 90 Cap 0   • [DISCONTINUED] NIFEdipine SR (PROCARDIA-XL) 30 MG tablet Take 30 mg by mouth every day.       No facility-administered encounter medications on file as of 2/4/2021.      Review of Systems   Constitutional: Positive for weight loss.   HENT: Negative.    Respiratory: Negative.  Negative for shortness of breath.         History of sleep apnea.  He is in the process of being evaluated.    History of pulmonary embolism   Cardiovascular: Positive for leg swelling. Negative for chest pain and palpitations.        History of DVT   Gastrointestinal: Negative.    Musculoskeletal: Positive for joint pain.   Skin: Negative.    Neurological: Negative.    Endo/Heme/Allergies: Negative.  Does not bruise/bleed easily.   Psychiatric/Behavioral:        History of methamphetamine use from age 17 to age 47.    States that he has been abstinent for approximately 2 months.        Objective:   /80 (BP Location: Left arm, Patient Position: Sitting, BP Cuff Size: Adult)   Pulse 89   Ht 1.676 m (5' 6\")   Wt (!) 148 kg (327 lb)   SpO2 92%   BMI 52.78 kg/m²     Physical Exam   Constitutional: He is oriented to person, place, and time. He appears well-nourished. No distress.   Obese "   HENT:   Head: Normocephalic and atraumatic.   Eyes: Pupils are equal, round, and reactive to light. No scleral icterus.   Neck: No JVD present. No tracheal deviation present.   Cardiovascular: Normal rate and regular rhythm.   No murmur heard.  Pulmonary/Chest: Breath sounds normal. No respiratory distress. He has no wheezes.   Abdominal: Soft. Bowel sounds are normal. He exhibits no distension. There is no abdominal tenderness.   Obese   Musculoskeletal:         General: Edema present.      Comments: 2+ on the left, 1+ on the right   Neurological: He is alert and oriented to person, place, and time.   Skin: Skin is warm and dry.   Psychiatric: He has a normal mood and affect.       Assessment:     1. Morbid obesity ([facility])  REFERRAL TO DIABETIC EDUCATION   2. Chronic anticoagulation  REFERRAL TO DIABETIC EDUCATION   3. Localized edema  REFERRAL TO DIABETIC EDUCATION   4. Methamphetamine abuse in remission ([facility])         Medical Decision Making:  Today's Assessment / Status / Plan:   Edema: Multifactorial due in part to previous left lower leg surgery, history of DVT, obesity, inactivity and untreated sleep apnea.  As noted above his left heart systolic and diastolic function is unremarkable.  Also, the patient is on nifedipine which can aggravate including weight loss leg elevation and compression stockings.  He will be taken off nifedipine and started on losartan, as nifedipine can aggravate edema..    Obesity: This is been a long-term problem with the patient.  Refer to dietary team for some help with his diet he is currently getting his mother's Meals on Wheels diabetic meals that are brought to the house.    Methamphetamine abuse: Patient has been abstinent for a couple of months.  Continue abstinence was strongly encouraged.    Sleep apnea: He is a process of being evaluated    Methamphetamine abuse: He has a long history of methamphetamine abuse, but has been absent a couple months.  He states that, in the  past, he and his parents would use methamphetamine together.  Continued abstinence from methamphetamine was strongly encouraged.    The patient problems edema are multifactorial as outlined above and not due to left heart failure.    He was given written instructions as to discontinue his nifedipine as well as his furosemide.  He will be started on bumetanide 1 mg a day which will hopefully be absorbed better and be more efficacious.  He will be started on losartan 50 mg a day for blood pressure control.  The patient will continue his current dose of potassium.  Nonpharmacologic measures of edema control were discussed including weight loss leg elevation and compression stockings.  Referred to dietary for counseling regarding weight  Repeat return in 1 month to reevaluate blood pressure and edema and then probably on an as-needed basis only thereafter.    Discussed nonpharmacologic treatment        Randall Carney P.A.-C.  [address] [place] [phone]  Via Fax: [phone]

## 2021-03-04 ENCOUNTER — OFFICE VISIT (OUTPATIENT)
Dept: CARDIOLOGY | Facility: MEDICAL CENTER | Age: 51
End: 2021-03-04
Payer: MEDICAID

## 2021-03-04 VITALS
OXYGEN SATURATION: 93 % | DIASTOLIC BLOOD PRESSURE: 62 MMHG | BODY MASS INDEX: 50.62 KG/M2 | SYSTOLIC BLOOD PRESSURE: 120 MMHG | HEART RATE: 86 BPM | HEIGHT: 66 IN | WEIGHT: 315 LBS

## 2021-03-04 DIAGNOSIS — I10 ESSENTIAL HYPERTENSION, BENIGN: ICD-10-CM

## 2021-03-04 DIAGNOSIS — R60.0 LOCALIZED EDEMA: ICD-10-CM

## 2021-03-04 PROCEDURE — 99213 OFFICE O/P EST LOW 20 MIN: CPT | Performed by: INTERNAL MEDICINE

## 2021-03-04 RX ORDER — POTASSIUM CHLORIDE 7.45 MG/ML
10 INJECTION INTRAVENOUS ONCE
COMMUNITY

## 2021-03-04 RX ORDER — METHOCARBAMOL 500 MG/1
500 TABLET, FILM COATED ORAL 4 TIMES DAILY
COMMUNITY

## 2021-03-04 ASSESSMENT — ENCOUNTER SYMPTOMS
BRUISES/BLEEDS EASILY: 0
RESPIRATORY NEGATIVE: 1
GASTROINTESTINAL NEGATIVE: 1
SHORTNESS OF BREATH: 0
NEUROLOGICAL NEGATIVE: 1
WEIGHT LOSS: 0
PALPITATIONS: 0

## 2021-03-04 ASSESSMENT — FIBROSIS 4 INDEX: FIB4 SCORE: 0.87

## 2021-03-04 NOTE — PROGRESS NOTES
Chief Complaint   Patient presents with   • Anticoagulation       Subjective:   Jose Carlos Khan is a 50 y.o. male who presents today for follow-up with dependent edema and hypertension.  Last visit, he was taken off nifedipine and changed from furosemide to bumetanide.  His edema has improved.  The patient is abstinent from meth for 3 months.  Echocardiogram demonstrated normal LV systolic function with EF of 65% and pulmonary pressure at 35 mmHg.  The patient's history of sleep apnea.  He states he is been off of methamphetamine after 3 months.    Past Medical History:   Diagnosis Date   • AVNRT (AV rashmi re-entry tachycardia) ([facility]) 1/16/2019     Past Surgical History:   Procedure Laterality Date   • GASTROSCOPY-ENDO N/A 1/14/2019    Procedure: GASTROSCOPY-ENDO;  Surgeon: Toro Reis M.D.;  Location: SURGERY [facility];  Service: Gastroenterology     History reviewed. No pertinent family history.  Social History     Socioeconomic History   • Marital status: Single     Spouse name: Not on file   • Number of children: Not on file   • Years of education: Not on file   • Highest education level: Not on file   Occupational History   • Not on file   Tobacco Use   • Smoking status: Current Every Day Smoker     Packs/day: 1.00   • Smokeless tobacco: Never Used   Substance and Sexual Activity   • Alcohol use: Yes     Comment: occ   • Drug use: No   • Sexual activity: Not on file   Other Topics Concern   • Not on file   Social History Narrative   • Not on file     Social Determinants of Health     Financial Resource Strain:    • Difficulty of Paying Living Expenses:    Food Insecurity:    • Worried About Running Out of Food in the Last Year:    • Ran Out of Food in the Last Year:    Transportation Needs:    • Lack of Transportation (Medical):    • Lack of Transportation (Non-Medical):    Physical Activity:    • Days of Exercise per Week:    • Minutes of Exercise per Session:    Stress:    • Feeling of Stress :     Social Connections:    • Frequency of Communication with Friends and Family:    • Frequency of Social Gatherings with Friends and Family:    • Attends [employer] Services:    • Active Member of Clubs or Organizations:    • Attends Club or Organization Meetings:    • Marital Status:    Intimate Partner Violence:    • Fear of Current or Ex-Partner:    • Emotionally Abused:    • Physically Abused:    • Sexually Abused:      No Known Allergies  Outpatient Encounter Medications as of 3/4/2021   Medication Sig Dispense Refill   • methocarbamol (ROBAXIN) 500 MG Tab Take 500 mg by mouth 4 times a day.     • potassium chloride (KCL) 10 MEQ/100ML Solution Infuse 10 mEq into a venous catheter one time.     • bumetanide (BUMEX) 1 MG Tab Take 1 Tab by mouth every day. 30 Tab 6   • losartan (COZAAR) 50 MG Tab Take 1 Tab by mouth every day. 30 Tab 6   • rivaroxaban (XARELTO) 20 MG Tab tablet Take 20 mg by mouth every morning.     • acetaminophen (TYLENOL) 500 MG Tab Take 1,000 mg by mouth every 6 hours as needed. Indications: Pain     • omeprazole (PRILOSEC) 40 MG delayed-release capsule Take 1 Cap by mouth 2 times a day. For 7 days, qday thereafter. (Patient taking differently: Take 40 mg by mouth every day. ONCE DAILY) 90 Cap 0   • potassium citrate (UROCIT-K) 5 MEQ (540 MG) Tab CR Take 5 mEq by mouth every day.     • potassium citrate SR (UROCIT-K SR) 10 MEQ (1080 MG) Tab CR Take 10 mEq by mouth every day.     • furosemide (LASIX) 20 MG Tab Take 20 mg by mouth every morning.     • metoprolol SR (TOPROL XL) 25 MG TABLET SR 24 HR Take 1 Tab by mouth every day. (Patient not taking: Reported on 3/4/2021) 30 Tab 3     No facility-administered encounter medications on file as of 3/4/2021.     Review of Systems   Constitutional: Negative for weight loss.   HENT: Negative.    Respiratory: Negative.  Negative for shortness of breath.         History of sleep apnea.  He is in the process of being evaluated.    History of pulmonary  "embolism   Cardiovascular: Negative for chest pain, palpitations and leg swelling.        History of DVT   Gastrointestinal: Negative.    Musculoskeletal: Positive for joint pain.   Skin: Negative.    Neurological: Negative.    Endo/Heme/Allergies: Negative.  Does not bruise/bleed easily.   Psychiatric/Behavioral:        History of methamphetamine use from age 17 to age 47.    States that he has been abstinent for approximately 3 months.        Objective:   /62 (BP Location: Left arm, Patient Position: Sitting, BP Cuff Size: Adult)   Pulse 86   Ht 1.676 m (5' 6\")   Wt (!) 153 kg (338 lb)   SpO2 93%   BMI 54.55 kg/m²     Physical Exam    Assessment:     1. Localized edema     2. Essential hypertension, benign         Medical Decision Making:  Today's Assessment / Status / Plan:   Edema: Multifactorial and due in part to obesity, sleep apnea, inactivity and prior left leg injury.  His edema is better off nifedipine and on bumetanide instead of furosemide.    Hypertension: Under excellent control on his current medications.    I discussed the nonpharmacologic methods of reducing edema including activity, weight loss, and leg elevation.  Encouraged him to stay off methamphetamine.  Return as needed only.  "

## 2021-05-26 DIAGNOSIS — I10 ESSENTIAL HYPERTENSION, BENIGN: ICD-10-CM

## 2021-05-28 RX ORDER — BUMETANIDE 1 MG/1
1 TABLET ORAL DAILY
Qty: 90 TABLET | Refills: 3 | Status: SHIPPED | OUTPATIENT
Start: 2021-05-28

## 2021-05-28 RX ORDER — LOSARTAN POTASSIUM 50 MG/1
50 TABLET ORAL DAILY
Qty: 90 TABLET | Refills: 3 | Status: SHIPPED | OUTPATIENT
Start: 2021-05-28

## 2022-03-11 ENCOUNTER — HOSPITAL ENCOUNTER (EMERGENCY)
Facility: MEDICAL CENTER | Age: 52
End: 2022-03-11
Attending: EMERGENCY MEDICINE
Payer: MEDICAID

## 2022-03-11 ENCOUNTER — APPOINTMENT (OUTPATIENT)
Dept: RADIOLOGY | Facility: MEDICAL CENTER | Age: 52
End: 2022-03-11
Attending: EMERGENCY MEDICINE
Payer: MEDICAID

## 2022-03-11 VITALS
WEIGHT: 315 LBS | HEART RATE: 84 BPM | SYSTOLIC BLOOD PRESSURE: 142 MMHG | TEMPERATURE: 98 F | HEIGHT: 66 IN | RESPIRATION RATE: 17 BRPM | OXYGEN SATURATION: 95 % | BODY MASS INDEX: 50.62 KG/M2 | DIASTOLIC BLOOD PRESSURE: 81 MMHG

## 2022-03-11 DIAGNOSIS — R06.00 DYSPNEA, UNSPECIFIED TYPE: ICD-10-CM

## 2022-03-11 DIAGNOSIS — R10.9 RIGHT FLANK PAIN: ICD-10-CM

## 2022-03-11 DIAGNOSIS — R31.29 MICROSCOPIC HEMATURIA: ICD-10-CM

## 2022-03-11 LAB
ALBUMIN SERPL BCP-MCNC: 4.2 G/DL (ref 3.2–4.9)
ALBUMIN/GLOB SERPL: 1.2 G/DL
ALP SERPL-CCNC: 99 U/L (ref 30–99)
ALT SERPL-CCNC: 39 U/L (ref 2–50)
ANION GAP SERPL CALC-SCNC: 13 MMOL/L (ref 7–16)
APPEARANCE UR: CLEAR
AST SERPL-CCNC: 28 U/L (ref 12–45)
BACTERIA #/AREA URNS HPF: NEGATIVE /HPF
BASOPHILS # BLD AUTO: 0.8 % (ref 0–1.8)
BASOPHILS # BLD: 0.05 K/UL (ref 0–0.12)
BILIRUB SERPL-MCNC: 1 MG/DL (ref 0.1–1.5)
BILIRUB UR QL STRIP.AUTO: NEGATIVE
BUN SERPL-MCNC: 14 MG/DL (ref 8–22)
CALCIUM SERPL-MCNC: 9.1 MG/DL (ref 8.5–10.5)
CHLORIDE SERPL-SCNC: 102 MMOL/L (ref 96–112)
CO2 SERPL-SCNC: 26 MMOL/L (ref 20–33)
COLOR UR: YELLOW
CREAT SERPL-MCNC: 0.81 MG/DL (ref 0.5–1.4)
EKG IMPRESSION: NORMAL
EOSINOPHIL # BLD AUTO: 0.4 K/UL (ref 0–0.51)
EOSINOPHIL NFR BLD: 6.5 % (ref 0–6.9)
EPI CELLS #/AREA URNS HPF: NEGATIVE /HPF
ERYTHROCYTE [DISTWIDTH] IN BLOOD BY AUTOMATED COUNT: 51.1 FL (ref 35.9–50)
GLOBULIN SER CALC-MCNC: 3.4 G/DL (ref 1.9–3.5)
GLUCOSE SERPL-MCNC: 104 MG/DL (ref 65–99)
GLUCOSE UR STRIP.AUTO-MCNC: NEGATIVE MG/DL
HCT VFR BLD AUTO: 43.1 % (ref 42–52)
HGB BLD-MCNC: 14.5 G/DL (ref 14–18)
HYALINE CASTS #/AREA URNS LPF: ABNORMAL /LPF
IMM GRANULOCYTES # BLD AUTO: 0.03 K/UL (ref 0–0.11)
IMM GRANULOCYTES NFR BLD AUTO: 0.5 % (ref 0–0.9)
KETONES UR STRIP.AUTO-MCNC: NEGATIVE MG/DL
LEUKOCYTE ESTERASE UR QL STRIP.AUTO: NEGATIVE
LIPASE SERPL-CCNC: 34 U/L (ref 11–82)
LYMPHOCYTES # BLD AUTO: 1.7 K/UL (ref 1–4.8)
LYMPHOCYTES NFR BLD: 27.6 % (ref 22–41)
MCH RBC QN AUTO: 31.3 PG (ref 27–33)
MCHC RBC AUTO-ENTMCNC: 33.6 G/DL (ref 33.7–35.3)
MCV RBC AUTO: 93.1 FL (ref 81.4–97.8)
MICRO URNS: ABNORMAL
MONOCYTES # BLD AUTO: 0.68 K/UL (ref 0–0.85)
MONOCYTES NFR BLD AUTO: 11.1 % (ref 0–13.4)
NEUTROPHILS # BLD AUTO: 3.29 K/UL (ref 1.82–7.42)
NEUTROPHILS NFR BLD: 53.5 % (ref 44–72)
NITRITE UR QL STRIP.AUTO: NEGATIVE
NRBC # BLD AUTO: 0 K/UL
NRBC BLD-RTO: 0 /100 WBC
NT-PROBNP SERPL IA-MCNC: 58 PG/ML (ref 0–125)
PH UR STRIP.AUTO: 5.5 [PH] (ref 5–8)
PLATELET # BLD AUTO: 139 K/UL (ref 164–446)
PMV BLD AUTO: 10.8 FL (ref 9–12.9)
POTASSIUM SERPL-SCNC: 3.6 MMOL/L (ref 3.6–5.5)
PROT SERPL-MCNC: 7.6 G/DL (ref 6–8.2)
PROT UR QL STRIP: NEGATIVE MG/DL
RBC # BLD AUTO: 4.63 M/UL (ref 4.7–6.1)
RBC # URNS HPF: ABNORMAL /HPF
RBC UR QL AUTO: ABNORMAL
SODIUM SERPL-SCNC: 141 MMOL/L (ref 135–145)
SP GR UR STRIP.AUTO: 1.03
UROBILINOGEN UR STRIP.AUTO-MCNC: 1 MG/DL
WBC # BLD AUTO: 6.2 K/UL (ref 4.8–10.8)
WBC #/AREA URNS HPF: ABNORMAL /HPF

## 2022-03-11 PROCEDURE — 93005 ELECTROCARDIOGRAM TRACING: CPT | Performed by: EMERGENCY MEDICINE

## 2022-03-11 PROCEDURE — 700117 HCHG RX CONTRAST REV CODE 255: Performed by: EMERGENCY MEDICINE

## 2022-03-11 PROCEDURE — 71275 CT ANGIOGRAPHY CHEST: CPT

## 2022-03-11 PROCEDURE — 83690 ASSAY OF LIPASE: CPT

## 2022-03-11 PROCEDURE — 85025 COMPLETE CBC W/AUTO DIFF WBC: CPT

## 2022-03-11 PROCEDURE — 81001 URINALYSIS AUTO W/SCOPE: CPT

## 2022-03-11 PROCEDURE — 80053 COMPREHEN METABOLIC PANEL: CPT

## 2022-03-11 PROCEDURE — 99285 EMERGENCY DEPT VISIT HI MDM: CPT

## 2022-03-11 PROCEDURE — 74177 CT ABD & PELVIS W/CONTRAST: CPT

## 2022-03-11 PROCEDURE — 36415 COLL VENOUS BLD VENIPUNCTURE: CPT

## 2022-03-11 PROCEDURE — 83880 ASSAY OF NATRIURETIC PEPTIDE: CPT

## 2022-03-11 RX ADMIN — IOHEXOL 100 ML: 350 INJECTION, SOLUTION INTRAVENOUS at 13:59

## 2022-03-11 ASSESSMENT — FIBROSIS 4 INDEX: FIB4 SCORE: 0.89

## 2022-03-11 NOTE — ED NOTES
Patient noted to have oxygen saturation 85% on room air while sleeping. Patient placed on 2 L nasal cannula. Oxygen saturation 94% at this time.

## 2022-03-11 NOTE — ED NOTES
Rounded on patient. Patient resting in bed. No signs of distress noted. Equal chest rise and fall. No further needs at this time. Call light within reach. Cell phone provided to patient.

## 2022-03-11 NOTE — ED NOTES
Patient returned from imaging. Up at bedside to use urinal. Urine sample collected and sent to lab. Call light within reach.

## 2022-03-11 NOTE — ED PROVIDER NOTES
ED Provider Note    ED Provider Note    Primary care provider: No primary care provider on file.  Means of arrival: EMS  History obtained from: Patient    CHIEF COMPLAINT  Chief Complaint   Patient presents with   • Flank Pain     Constant right flank 5/10 pain, onset 3 days ago    • Blood in Urine     X 2 days, blood following urination, associated with burning    • Leg Swelling     BLE, increased in the past couple weeks   • Tingling     L hand and fingers tingling x 4 days, decreased strength left hand      Seen at 12:45 PM.   HPI  Jose Carlos Khan is a 51 y.o. male who presents to the Emergency Department for 2 to 3 days of some mild shortness of breath, right flank pain and some blood in the urine.  He also notes some lower extremity swelling as well.  The patient has had a history of chronic lower extremity edema, he normally keeps his feet up most of the day but over the past week he has been driving, looking for houses in [place] and has results of this his feet  have not been elevated causing the swelling.  With regards to the shortness of breath, he notes it at all times, not particularly worse with exertion, though he does not exert himself very often.  He denies any associated chest pain, cough, fevers or chills.    For the flank pain, it is constant, nonradiating, mild with the associate hematuria, no dysuria.  He denies any history of ureterolithiasis.  Denies any abdominal pain.    He did have a previous history of methamphetamine use but last used about 8 months ago.  He does admit to some increased stress as his father is in the hospital.  He also notes some tingling of the left hand which is in a glovelike distribution, stopping at the wrist without any modifying factors.  He has had this in the past when he was having stress as well.    REVIEW OF SYSTEMS  See HPI,   Remainder of ROS negative.     PAST MEDICAL HISTORY   has a past medical history of AVNRT (AV rashmi re-entry tachycardia) ([facility])  "(1/16/2019).    SURGICAL HISTORY   has a past surgical history that includes gastroscopy-endo (N/A, 1/14/2019).    SOCIAL HISTORY  Social History     Tobacco Use   • Smoking status: Current Every Day Smoker     Packs/day: 1.00   • Smokeless tobacco: Never Used   Vaping Use   • Vaping Use: Never used   Substance Use Topics   • Alcohol use: Yes     Comment: occ   • Drug use: Yes     Types: Inhaled     Comment: marijuana, meth former user      Social History     Substance and Sexual Activity   Drug Use Yes   • Types: Inhaled    Comment: marijuana, meth former user       FAMILY HISTORY  History reviewed. No pertinent family history.    CURRENT MEDICATIONS  Reviewed.  See Encounter Summary.     ALLERGIES  No Known Allergies    PHYSICAL EXAM  VITAL SIGNS: /61   Pulse 80   Temp 36.7 °C (98 °F) (Temporal)   Resp 18   Ht 1.676 m (5' 6\")   Wt (!) 163 kg (360 lb)   SpO2 97%   BMI 58.11 kg/m²   Constitutional: Awake, alert in no apparent distress.  Significantly elevated BMI.    HENT: Normocephalic, Bilateral external ears normal. Nose normal.   Eyes: Conjunctiva normal, non-icteric, EOMI.    Thorax & Lungs: Easy unlabored respirations, Clear to ascultation bilaterally.  Cardiovascular: Regular rate, Regular rhythm, No murmurs, rubs or gallops. Bilateral pulses symmetrical.   Abdomen:  Soft, nontender, nondistended, normal active bowel sounds.   :    Skin: Visualized skin is  Dry, No erythema, No rash.   Musculoskeletal: 2+ bilateral lower extremity edema with some mild skin changes consistent with venous stasis.  Neurologic: Alert, Grossly non-focal.   Psychiatric: Normal affect, Normal mood  Lymphatic:  No cervical LAD    EKG   12 lead Interpreted by me  Rhythm:  Normal sinus rhythm   Rate: 75  Axis: normal  Ectopy: none  Conduction: RBBB  ST Segments: Nonspecific changes  T Waves: no acute change  Clinical Impression: Normal sinus rhythm with right bundle branch block, RBBB was seen on prior EKG, there is some " nonspecific changes, borderline depression diffusely, prior EKG is from 2019.  RADIOLOGY  CT-ABDOMEN-PELVIS WITH   Final Result      1.  No acute intra-abdominal findings.      2.  Nonspecific mildly enlarged periportal lymph nodes      3.  Splenomegaly      4.  Hepatic steatosis      CT-CTA CHEST PULMONARY ARTERY W/ RECONS   Final Result      1.  No evidence of pulmonary embolism.   2.  Small hiatal hernia.               COURSE & MEDICAL DECISION MAKING  Pertinent Labs & Imaging studies reviewed. (See chart for details)    Differential diagnoses include but are not limited to: Malignancy, PE, ureterolithiasis    12:45 PM - Medical record reviewed, no recent ER visits, there is a visit from [facility] P[facility]co in 2021 where the patient had significant debilitation and was getting an order for a powered wheelchair, CT from 2019 showed a renal mass, prior history of methamphetamine use.  4:10 PM: We discussed the plan for discharge.  Patient in agreement.  Decision Making:  This is a pleasant 51 y.o. year old male who presents with hematuria.  I reviewed the medical record, the patient did have a possible mass shown on CT from 2019 that did not appear to be followed up.  I was greatly concerned that he may have metastatic renal cancer.  The patient underwent a CTA of the chest as well as abdomen and pelvis with contrast.  He does not have any sign of pulmonary embolus, no infiltrate.  He has no renal mass either.  No signs of ureterolithiasis.  Urinalysis does show 10-20 RBCs but no evidence of infection.  At this time I do not know the cause of the hematuria but it is microscopic without any sign of malignancy or stone.  This can be followed up in urology for possible cystoscopy if it persists.    Remainder of work-up today is unrevealing.  proBNP is not elevated, CT does not show any evidence of heart failure either.  He has no leukocytosis or leftward shift.  EKG does not show acute ischemic changes.  Patient has not had  any chest pain.    In summary this is a 51-year-old male with a BMI of 58 who presents with some mild shortness of breath, right flank pain.  Work-up does not reveal any emergent process.  He has not had any increased work of breathing, oxygenation is normal when awake, desats when asleep likely due to NINO.  I feel that he can be safely discharged at this time, recommend he follow-up his primary care physician for additional management.  A good many of his symptoms may be related to habitus as this could just be musculoskeletal back pain and dyspnea from significantly elevated BMI.  He did mention having some paresthesias of the left hand, this is not consistent with any typical pattern, he has had this in the past related to stressful conditions.  It is not the pattern of carpal or cubital tunnel, nor would it be a brachial plexus injury or central component.  Asymmetrical location not concerning for a peripheral neuropathy.        Discharge Medications:  New Prescriptions    No medications on file       The patient was discharged home (see d/c instructions) was told to return immediately for any signs or symptoms listed, or any worsening at all.  The patient verbally agreed to the discharge precautions and follow-up plan which is documented in EPIC.        FINAL IMPRESSION  1. Right flank pain    2. Microscopic hematuria    3. Dyspnea, unspecified type

## 2022-03-11 NOTE — ED TRIAGE NOTES
Jose Carlos Khan  51 y.o. male  Chief Complaint   Patient presents with   • Flank Pain     Constant right flank 5/10 pain, onset 3 days ago    • Blood in Urine     X 2 days, blood following urination, associated with burning    • Leg Swelling     BLE, increased in the past couple weeks   • Tingling     L hand and fingers tingling x 4 days, decreased strength left hand      Pt BIB EMS for above complaints.    EMS [facility]    Pt is alert and oriented, speaking in full sentences, follows commands and responds appropriately to questions. Resp are even and unlabored.   This RN masked and in appropriate PPE during encounter.     Vitals:    03/11/22 1218   BP: 133/72   Pulse: 80   Resp: 20   Temp: 36.9 °C (98.4 °F)   SpO2: 92%

## 2022-03-12 NOTE — ED NOTES
Patient provided discharge instructions. Patient verbalized understanding. Patient leaving ER in stable condition. IV removed.

## 2022-03-12 NOTE — DISCHARGE PLANNING
note:    RN CM received incoming from pt requesting information regarding Rx from ED encounter on 3/11/2022. RN CM reviewed pt's chart and informed pt that per Dr. Ayo Hampton's note pt was not prescribed any medications during this encounter. However per pt's AVS, Dr. Hampton did refer the pt for follow up with Dr. Connor Bar with Urology for hematuria. RN CM provided the pt with Dr. Bar's contact information. Pt verbalized understanding.

## (undated) DEVICE — FORCEP RADIAL JAW 4 STANDARD CAPACITY W/NEEDLE 240CM (40EA/BX)

## (undated) DEVICE — KIT CUSTOM PROCEDURE SINGLE FOR ENDO  (15/CA)

## (undated) DEVICE — SUCTION INSTRUMENT YANKAUER BULBOUS TIP W/O VENT (50EA/CA)

## (undated) DEVICE — BITE BLOCK ADULT 60FR (100EA/CA)

## (undated) DEVICE — CONTAINER, SPECIMEN, STERILE

## (undated) DEVICE — FILM CASSETTE ENDO

## (undated) DEVICE — TUBE E-T HI-LO CUFF 8.0MM (10EA/PK)